# Patient Record
Sex: FEMALE | Race: WHITE | NOT HISPANIC OR LATINO | Employment: PART TIME | URBAN - METROPOLITAN AREA
[De-identification: names, ages, dates, MRNs, and addresses within clinical notes are randomized per-mention and may not be internally consistent; named-entity substitution may affect disease eponyms.]

---

## 2017-01-17 ENCOUNTER — TELEPHONE (OUTPATIENT)
Dept: INTERNAL MEDICINE | Facility: CLINIC | Age: 47
End: 2017-01-17

## 2017-01-17 NOTE — TELEPHONE ENCOUNTER
Patient advised as per below, and appt scheduled 2017.  sj    ----- Message from Cande Salcedo MA sent at 2017  5:47 PM EST -----  Regarding: FW: THYROID  Contact: [phone]      ----- Message -----     From: Trace Hughes MD     Sent: 2017   5:07 PM       To: [employer]  [facility]  Subject: FW: THYROID                                      Labs were okay, but a thyroid nodule can be present with normal labs.  She needs an appointment so I can examine her and see if she needs to have a thyroid ultrasound done.  May need more labs as well.  Thanks.  ----- Message -----     From: Karla Somers MA     Sent: 2017   4:06 PM       To: Trace Hughes MD  Subject: FW: THYROID                                          ----- Message -----     From: Ibis Otero     Sent: 2017   3:51 PM       To: [employer] [facility]  Subject: THYROID                                          :  68    ELLEN PATIENT    PATIENT CALLED VERY CONCERNED THAT HER DENTIST TOLD HER SHE HAD A LUMP ON HER THYROID. SHE WONDERS IF ANYTHING HAD SHOWN UP ON HER LABS.    PLEASE CALL AND DISCUSS WITH HER ABOUT HER THYROID LAB RESULTS AND IF SHE NEEDS TO SEE DR. HUGHES.    CELL # [phone]

## 2017-01-19 ENCOUNTER — OFFICE VISIT (OUTPATIENT)
Dept: INTERNAL MEDICINE | Facility: CLINIC | Age: 47
End: 2017-01-19

## 2017-01-19 VITALS
SYSTOLIC BLOOD PRESSURE: 80 MMHG | BODY MASS INDEX: 19.83 KG/M2 | DIASTOLIC BLOOD PRESSURE: 58 MMHG | HEART RATE: 80 BPM | WEIGHT: 105 LBS | HEIGHT: 61 IN | OXYGEN SATURATION: 98 %

## 2017-01-19 DIAGNOSIS — R53.83 OTHER FATIGUE: ICD-10-CM

## 2017-01-19 DIAGNOSIS — G47.00 INSOMNIA, UNSPECIFIED TYPE: ICD-10-CM

## 2017-01-19 DIAGNOSIS — Z86.39 HX OF THYROID NODULE: Primary | ICD-10-CM

## 2017-01-19 LAB
T4 FREE SERPL-MCNC: 1.01 NG/DL (ref 0.93–1.7)
TSH SERPL DL<=0.005 MIU/L-ACNC: 0.78 MIU/ML (ref 0.27–4.2)

## 2017-01-19 PROCEDURE — 99213 OFFICE O/P EST LOW 20 MIN: CPT | Performed by: FAMILY MEDICINE

## 2017-01-19 RX ORDER — AMITRIPTYLINE HYDROCHLORIDE 10 MG/1
40 TABLET, FILM COATED ORAL NIGHTLY
Qty: 120 TABLET | Refills: 6 | Status: SHIPPED | OUTPATIENT
Start: 2017-01-19 | End: 2017-09-03 | Stop reason: SDUPTHER

## 2017-01-19 NOTE — PROGRESS NOTES
Subjective     Purnima Altamirano is a 47 y.o. female, who presents with a chief complaint of   Chief Complaint   Patient presents with   • Thyroid Problem   • Follow-up       HPI     Pt comes in concerned about a possible thyroid nodule that was discovered by her dentist.  She is feeling fatigued and doesn't recover as well from her workouts.  She exercises approximately 7 hours weekly, including cardio and strength.    The following portions of the patient's history were reviewed and updated as appropriate: allergies, current medications, past family history, past medical history, past social history, past surgical history and problem list.    Allergies: Cefdinir    Review of Systems   Constitutional: Positive for fatigue.   HENT: Negative.    Eyes: Negative.    Respiratory: Negative.    Cardiovascular: Negative.    Gastrointestinal: Negative.    Endocrine: Negative.    Genitourinary: Positive for menstrual problem (heavy menses).   Musculoskeletal: Negative.    Skin: Negative.    Allergic/Immunologic: Negative.    Neurological: Negative.    Hematological: Negative.    Psychiatric/Behavioral: Positive for sleep disturbance.       Objective     Wt Readings from Last 3 Encounters:   01/19/17 105 lb (47.6 kg)   08/09/16 105 lb (47.6 kg)   05/23/16 104 lb 6.4 oz (47.4 kg)     Temp Readings from Last 3 Encounters:   No data found for Temp     BP Readings from Last 3 Encounters:   01/19/17 (!) 80/58   08/09/16 102/66   05/23/16 92/60     Pulse Readings from Last 3 Encounters:   01/19/17 80   08/09/16 89   05/23/16 75     Body mass index is 19.84 kg/(m^2).    Physical Exam   Constitutional: She is oriented to person, place, and time. She appears well-developed and well-nourished.   HENT:   Head: Normocephalic.   Mouth/Throat: Oropharynx is clear and moist.   Eyes: Conjunctivae and EOM are normal. Pupils are equal, round, and reactive to light.   Neck: Normal range of motion. Neck supple. No thyromegaly (I was not able to  feel a nodule on her thyroid.) present.   Cardiovascular: Normal rate, regular rhythm and normal heart sounds.    Pulmonary/Chest: Effort normal and breath sounds normal.   Abdominal: Soft. Bowel sounds are normal. There is no hepatosplenomegaly.   Musculoskeletal: Normal range of motion. She exhibits no edema.   Lymphadenopathy:     She has no cervical adenopathy.   Neurological: She is alert and oriented to person, place, and time.   Skin: Skin is warm and dry. No rash noted.   Psychiatric: She has a normal mood and affect. Her behavior is normal.   Vitals reviewed.          Results for orders placed or performed in visit on 05/28/16   Comprehensive metabolic panel   Result Value Ref Range    Glucose 85 65 - 99 mg/dL    BUN 19 6 - 20 mg/dL    Creatinine 0.70 0.57 - 1.00 mg/dL    eGFR Non African Am 90 >60 mL/min/1.73    eGFR African Am 109 >60 mL/min/1.73    BUN/Creatinine Ratio 27.1 (H) 7.0 - 25.0    Sodium 142 136 - 145 mmol/L    Potassium 4.4 3.5 - 5.2 mmol/L    Chloride 107 98 - 107 mmol/L    Total CO2 25.8 22.0 - 29.0 mmol/L    Calcium 8.0 (L) 8.6 - 10.5 mg/dL    Total Protein 5.9 (L) 6.0 - 8.5 g/dL    Albumin 3.90 3.50 - 5.20 g/dL    Globulin 2.0 gm/dL    A/G Ratio 2.0 g/dL    Total Bilirubin 0.3 0.2 - 1.2 mg/dL    Alkaline Phosphatase 44 40 - 129 U/L    AST (SGOT) 20 5 - 32 U/L    ALT (SGPT) 19 5 - 33 U/L   Lipid Panel w/ Chol/HDL Ratio   Result Value Ref Range    Total Cholesterol 151 0 - 200 mg/dL    Triglycerides 31 0 - 150 mg/dL    HDL Cholesterol 75 (H) 40 - 60 mg/dL    VLDL Cholesterol 6.2 (L) 7 - 27 mg/dL    LDL Cholesterol  70 0 - 100 mg/dL    Chol/HDL Ratio 2.01    TSH   Result Value Ref Range    TSH 1.360 0.270 - 4.200 mIU/mL   Luteinizing hormone   Result Value Ref Range    LH 4.3 mIU/mL   FSH   Result Value Ref Range    FSH 7.2 mIU/mL   Estrogens, fractionated   Result Value Ref Range    Estrone 49 pg/mL    Estradiol 39.3 pg/mL   Vitamin D 25 hydroxy   Result Value Ref Range    25 Hydroxy,  Vitamin D 58.1 ng/mL   CBC and Differential   Result Value Ref Range    WBC 3.82 (L) 4.80 - 10.80 10*3/mm3    RBC 4.14 (L) 4.20 - 5.40 10*6/mm3    Hemoglobin 13.1 12.0 - 16.0 g/dL    Hematocrit 40.3 37.0 - 47.0 %    MCV 97.3 81.0 - 99.0 fL    MCH 31.6 (H) 27.0 - 31.0 pg    MCHC 32.5 31.0 - 37.0 g/dL    RDW 12.1 11.5 - 14.5 %    Platelets 168 140 - 500 10*3/mm3    Neutrophil Rel % 76.0 (H) 45.0 - 70.0 %    Lymphocyte Rel % 14.1 (L) 20.0 - 45.0 %    Monocyte Rel % 7.3 3.0 - 8.0 %    Eosinophil Rel % 1.8 0.0 - 4.0 %    Basophil Rel % 0.8 0.0 - 2.0 %    Neutrophils Absolute 2.90 1.50 - 8.30 10*3/mm3    Lymphocytes Absolute 0.54 (L) 0.60 - 4.80 10*3/mm3    Monocytes Absolute 0.28 0.00 - 1.00 10*3/mm3    Eosinophils Absolute 0.07 (L) 0.10 - 0.30 10*3/mm3    Basophils Absolute 0.03 0.00 - 0.20 10*3/mm3    Immature Granulocyte Rel % 0.0 0.0 - 0.5 %    Immature Grans Absolute 0.00 0.00 - 0.03 10*3/mm3    nRBC 0.0 0.0 - 0.0 /100 WBC       Assessment/Plan   Purnima was seen today for thyroid problem and follow-up.    Diagnoses and all orders for this visit:    Hx of thyroid nodule  -     Thyroid Antibodies; Future  -     TSH  -     T4, Free  -     T3, Free; Future  -     US Thyroid; Future    Other fatigue  -     Ferritin    Insomnia, unspecified type  -     amitriptyline (ELAVIL) 10 MG tablet; Take 4 tablets by mouth Every Night.          Current Outpatient Prescriptions:   •  amitriptyline (ELAVIL) 10 MG tablet, Take 4 tablets by mouth Every Night., Disp: 120 tablet, Rfl: 6  •  mometasone-formoterol (DULERA) 100-5 MCG/ACT inhaler, Inhale 2 puffs 2 (two) times a day., Disp: 1 g, Rfl: 11  •  montelukast (SINGULAIR) 10 MG tablet, Take 1 tablet by mouth every night., Disp: 30 tablet, Rfl: 5  •  SUMAtriptan (IMITREX) 100 MG tablet, Take one tablet at onset of headache. May repeat dose one time in 2 hours if headache not relieved., Disp: , Rfl: 11  Medications Discontinued During This Encounter   Medication Reason   •  azithromycin (ZITHROMAX Z-RON) 250 MG tablet Therapy completed   • amitriptyline (ELAVIL) 10 MG tablet Reorder       Return in about 1 month (around 2/19/2017).

## 2017-01-19 NOTE — MR AVS SNAPSHOT
Purnima Altamirano   1/19/2017 2:30 PM   Office Visit    Dept Phone:  [phone]   Encounter #:  [id number]    Provider:  Trace Min MD   Department:  [facility] INTERNAL MED AND PEDS                Your Full Care Plan              Today's Medication Changes          These changes are accurate as of: 1/19/17  3:58 PM.  If you have any questions, ask your nurse or doctor.               Medication(s)that have changed:     amitriptyline 10 MG tablet   Commonly known as:  ELAVIL   Take 4 tablets by mouth Every Night.   What changed:    - how much to take  - how to take this  - when to take this   Changed by:  Trace Min MD         Stop taking medication(s)listed here:     azithromycin 250 MG tablet   Commonly known as:  ZITHROMAX Z-RON   Stopped by:  Trace Min MD                Where to Get Your Medications      These medications were sent to [facility]/pharmacy #[id number] - [facility] [address] [address] AT INTERSECTION OF [address] [phone] [address] [place] [id number] [place] [address]     Phone:  [phone]     amitriptyline 10 MG tablet                  Your Updated Medication List          This list is accurate as of: 1/19/17  3:58 PM.  Always use your most recent med list.                amitriptyline 10 MG tablet   Commonly known as:  ELAVIL   Take 4 tablets by mouth Every Night.       mometasone-formoterol 100-5 MCG/ACT inhaler   Commonly known as:  DULERA   Inhale 2 puffs 2 (two) times a day.       montelukast 10 MG tablet   Commonly known as:  SINGULAIR   Take 1 tablet by mouth every night.       SUMAtriptan 100 MG tablet   Commonly known as:  IMITREX               We Performed the Following     Ferritin     T3, Free     T4, Free     Thyroid Antibodies     TSH       You Were Diagnosed With        Codes Comments    Hx of thyroid nodule    -  Primary ICD-10-CM: Z86.39  ICD-9-CM: V12.29     Other fatigue     ICD-10-CM:  "R53.83  ICD-9-CM: 780.79     Insomnia, unspecified type     ICD-10-CM: G47.00  ICD-9-CM: 780.52       Instructions     None    Patient Instructions History      Upcoming Appointments     Visit Type Date Time Department    OFFICE VISIT 2017  2:30 PM [facility]      [employer] Signup     [facility] [employer] allows you to send messages to your doctor, view your test results, renew your prescriptions, schedule appointments, and more. To sign up, go to [employer] and click on the Sign Up Now link in the New User? box. Enter your [employer] Activation Code exactly as it appears below along with the last four digits of your Social Security Number and your Date of Birth () to complete the sign-up process. If you do not sign up before the expiration date, you must request a new code.    [employer] Activation Code: [email or web address]  Expires: 2017  3:58 PM    If you have questions, you can email [email or web address] or call [phone] to talk to our [employer] staff. Remember, [employer] is NOT to be used for urgent needs. For medical emergencies, dial 911.               Other Info from Your Visit           Allergies     Cefdinir        Reason for Visit     Thyroid Problem     Follow-up           Vital Signs     Blood Pressure Pulse Height Weight Oxygen Saturation Body Mass Index    80/58 80 61\" (154.9 cm) 105 lb (47.6 kg) 98% 19.84 kg/m2    Smoking Status                   Never Smoker           Problems and Diagnoses Noted     Tiredness    Hx of thyroid nodule    Difficulty falling or staying asleep        "

## 2017-01-20 LAB
FERRITIN SERPL-MCNC: 23.97 NG/ML (ref 13–150)
T3FREE SERPL-MCNC: 2.2 PG/ML (ref 2–4.4)
THYROGLOB AB SERPL-ACNC: <1 IU/ML (ref 0–0.9)
THYROPEROXIDASE AB SERPL-ACNC: 7 IU/ML (ref 0–34)

## 2017-01-25 ENCOUNTER — HOSPITAL ENCOUNTER (OUTPATIENT)
Dept: ULTRASOUND IMAGING | Facility: HOSPITAL | Age: 47
Discharge: HOME | End: 2017-01-25
Attending: FAMILY MEDICINE | Admitting: FAMILY MEDICINE

## 2017-01-25 DIAGNOSIS — Z86.39 HX OF THYROID NODULE: ICD-10-CM

## 2017-01-25 PROCEDURE — 76536 US EXAM OF HEAD AND NECK: CPT

## 2017-01-30 ENCOUNTER — TELEPHONE (OUTPATIENT)
Dept: INTERNAL MEDICINE | Facility: CLINIC | Age: 47
End: 2017-01-30

## 2017-01-30 NOTE — TELEPHONE ENCOUNTER
----- Message from Trace Min MD sent at 1/29/2017 10:33 AM EST -----  Regarding: RE: blood work results   Yes, all thyroid labs and ultrasound are normal.  Ferritin normal at 24.  Thanks.  ----- Message -----     From: Jeri Cheney MA     Sent: 1/26/2017   1:22 PM       To: Trace Min MD  Subject: blood work results                               The patient called requesting her lab results and u/s results. I did let her know that nothing came back abnormal on them. She was concerned about her Iron level.

## 2017-03-19 DIAGNOSIS — J45.20 ASTHMATIC BRONCHITIS, MILD INTERMITTENT, UNCOMPLICATED: ICD-10-CM

## 2017-03-20 RX ORDER — MONTELUKAST SODIUM 10 MG/1
TABLET ORAL
Qty: 30 TABLET | Refills: 5 | Status: SHIPPED | OUTPATIENT
Start: 2017-03-20 | End: 2018-07-23

## 2017-08-08 RX ORDER — SUMATRIPTAN 100 MG/1
TABLET, FILM COATED ORAL
Qty: 9 TABLET | Refills: 3 | Status: SHIPPED | OUTPATIENT
Start: 2017-08-08 | End: 2018-02-02 | Stop reason: SDUPTHER

## 2017-09-03 DIAGNOSIS — G47.00 INSOMNIA, UNSPECIFIED TYPE: ICD-10-CM

## 2017-09-05 RX ORDER — AMITRIPTYLINE HYDROCHLORIDE 10 MG/1
TABLET, FILM COATED ORAL
Qty: 120 TABLET | Refills: 6 | Status: SHIPPED | OUTPATIENT
Start: 2017-09-05 | End: 2018-04-01 | Stop reason: SDUPTHER

## 2017-11-27 ENCOUNTER — TRANSCRIBE ORDERS (OUTPATIENT)
Dept: ADMINISTRATIVE | Facility: HOSPITAL | Age: 47
End: 2017-11-27

## 2017-11-27 DIAGNOSIS — Z12.31 VISIT FOR SCREENING MAMMOGRAM: Primary | ICD-10-CM

## 2017-12-08 ENCOUNTER — HOSPITAL ENCOUNTER (OUTPATIENT)
Dept: MAMMOGRAPHY | Facility: HOSPITAL | Age: 47
Discharge: HOME | End: 2017-12-08
Attending: OBSTETRICS & GYNECOLOGY | Admitting: OBSTETRICS & GYNECOLOGY

## 2017-12-08 DIAGNOSIS — Z12.31 VISIT FOR SCREENING MAMMOGRAM: ICD-10-CM

## 2017-12-08 PROCEDURE — G0202 SCR MAMMO BI INCL CAD: HCPCS

## 2018-02-02 RX ORDER — SUMATRIPTAN 100 MG/1
TABLET, FILM COATED ORAL
Qty: 9 TABLET | Refills: 0 | Status: SHIPPED | OUTPATIENT
Start: 2018-02-02 | End: 2018-02-28 | Stop reason: SDUPTHER

## 2018-02-28 RX ORDER — SUMATRIPTAN 100 MG/1
TABLET, FILM COATED ORAL
Qty: 9 TABLET | Refills: 1 | Status: SHIPPED | OUTPATIENT
Start: 2018-02-28 | End: 2018-05-02 | Stop reason: SDUPTHER

## 2018-04-01 DIAGNOSIS — G47.00 INSOMNIA, UNSPECIFIED TYPE: ICD-10-CM

## 2018-04-02 RX ORDER — AMITRIPTYLINE HYDROCHLORIDE 10 MG/1
TABLET, FILM COATED ORAL
Qty: 120 TABLET | Refills: 0 | Status: SHIPPED | OUTPATIENT
Start: 2018-04-02 | End: 2018-05-02 | Stop reason: SDUPTHER

## 2018-05-02 DIAGNOSIS — G47.00 INSOMNIA, UNSPECIFIED TYPE: ICD-10-CM

## 2018-05-02 RX ORDER — SUMATRIPTAN 100 MG/1
TABLET, FILM COATED ORAL
Qty: 9 TABLET | Refills: 1 | Status: SHIPPED | OUTPATIENT
Start: 2018-05-02 | End: 2018-06-29 | Stop reason: SDUPTHER

## 2018-05-02 RX ORDER — AMITRIPTYLINE HYDROCHLORIDE 10 MG/1
TABLET, FILM COATED ORAL
Qty: 120 TABLET | Refills: 0 | Status: SHIPPED | OUTPATIENT
Start: 2018-05-02 | End: 2018-06-01 | Stop reason: SDUPTHER

## 2018-06-01 DIAGNOSIS — G47.00 INSOMNIA, UNSPECIFIED TYPE: ICD-10-CM

## 2018-06-01 RX ORDER — AMITRIPTYLINE HYDROCHLORIDE 10 MG/1
TABLET, FILM COATED ORAL
Qty: 120 TABLET | Refills: 0 | Status: SHIPPED | OUTPATIENT
Start: 2018-06-01 | End: 2018-07-06 | Stop reason: SDUPTHER

## 2018-06-29 RX ORDER — SUMATRIPTAN 100 MG/1
TABLET, FILM COATED ORAL
Qty: 9 TABLET | Refills: 0 | Status: SHIPPED | OUTPATIENT
Start: 2018-06-29 | End: 2018-07-23 | Stop reason: SDUPTHER

## 2018-06-30 DIAGNOSIS — G47.00 INSOMNIA, UNSPECIFIED TYPE: ICD-10-CM

## 2018-07-02 RX ORDER — AMITRIPTYLINE HYDROCHLORIDE 10 MG/1
TABLET, FILM COATED ORAL
Qty: 120 TABLET | Refills: 0 | OUTPATIENT
Start: 2018-07-02

## 2018-07-06 DIAGNOSIS — G47.00 INSOMNIA, UNSPECIFIED TYPE: ICD-10-CM

## 2018-07-06 RX ORDER — AMITRIPTYLINE HYDROCHLORIDE 10 MG/1
TABLET, FILM COATED ORAL
Qty: 120 TABLET | Refills: 0 | Status: SHIPPED | OUTPATIENT
Start: 2018-07-06 | End: 2018-07-23 | Stop reason: SDUPTHER

## 2018-07-09 DIAGNOSIS — Z00.00 ROUTINE HEALTH MAINTENANCE: Primary | ICD-10-CM

## 2018-07-19 ENCOUNTER — LAB (OUTPATIENT)
Dept: INTERNAL MEDICINE | Facility: CLINIC | Age: 48
End: 2018-07-19

## 2018-07-19 DIAGNOSIS — Z00.00 ROUTINE HEALTH MAINTENANCE: ICD-10-CM

## 2018-07-20 LAB
25(OH)D3+25(OH)D2 SERPL-MCNC: 50.6 NG/ML
ALBUMIN SERPL-MCNC: 4.4 G/DL (ref 3.5–5.2)
ALBUMIN/GLOB SERPL: 2.6 G/DL
ALP SERPL-CCNC: 36 U/L (ref 40–129)
ALT SERPL-CCNC: 20 U/L (ref 5–33)
AST SERPL-CCNC: 24 U/L (ref 5–32)
BASOPHILS # BLD AUTO: 0.01 10*3/MM3 (ref 0–0.2)
BASOPHILS NFR BLD AUTO: 0.3 % (ref 0–2)
BILIRUB SERPL-MCNC: 0.3 MG/DL (ref 0.2–1.2)
BUN SERPL-MCNC: 17 MG/DL (ref 6–20)
BUN/CREAT SERPL: 24.3 (ref 7–25)
CALCIUM SERPL-MCNC: 9.1 MG/DL (ref 8.6–10.5)
CHLORIDE SERPL-SCNC: 103 MMOL/L (ref 98–107)
CHOLEST SERPL-MCNC: 188 MG/DL (ref 0–200)
CHOLEST/HDLC SERPL: 2.04 {RATIO}
CO2 SERPL-SCNC: 28.3 MMOL/L (ref 22–29)
CREAT SERPL-MCNC: 0.7 MG/DL (ref 0.57–1)
EOSINOPHIL # BLD AUTO: 0.04 10*3/MM3 (ref 0.1–0.3)
EOSINOPHIL NFR BLD AUTO: 1.2 % (ref 0–4)
ERYTHROCYTE [DISTWIDTH] IN BLOOD BY AUTOMATED COUNT: 11.8 % (ref 11.5–14.5)
FSH SERPL-ACNC: 5 MIU/ML
GLOBULIN SER CALC-MCNC: 1.7 GM/DL
GLUCOSE SERPL-MCNC: 90 MG/DL (ref 65–99)
HCT VFR BLD AUTO: 43.1 % (ref 37–47)
HDLC SERPL-MCNC: 92 MG/DL (ref 40–60)
HGB BLD-MCNC: 14.5 G/DL (ref 12–16)
IMM GRANULOCYTES # BLD: 0 10*3/MM3 (ref 0–0.03)
IMM GRANULOCYTES NFR BLD: 0 % (ref 0–0.5)
LDLC SERPL CALC-MCNC: 89 MG/DL (ref 0–100)
LH SERPL-ACNC: 3.8 MIU/ML
LYMPHOCYTES # BLD AUTO: 0.75 10*3/MM3 (ref 0.6–4.8)
LYMPHOCYTES NFR BLD AUTO: 22.5 % (ref 20–45)
MCH RBC QN AUTO: 33.2 PG (ref 27–31)
MCHC RBC AUTO-ENTMCNC: 33.6 G/DL (ref 31–37)
MCV RBC AUTO: 98.6 FL (ref 81–99)
MONOCYTES # BLD AUTO: 0.29 10*3/MM3 (ref 0–1)
MONOCYTES NFR BLD AUTO: 8.7 % (ref 3–8)
NEUTROPHILS # BLD AUTO: 2.25 10*3/MM3 (ref 1.5–8.3)
NEUTROPHILS NFR BLD AUTO: 67.3 % (ref 45–70)
NRBC BLD AUTO-RTO: 0 /100 WBC (ref 0–0)
PLATELET # BLD AUTO: 187 10*3/MM3 (ref 140–500)
POTASSIUM SERPL-SCNC: 4 MMOL/L (ref 3.5–5.2)
PROT SERPL-MCNC: 6.1 G/DL (ref 6–8.5)
RBC # BLD AUTO: 4.37 10*6/MM3 (ref 4.2–5.4)
SODIUM SERPL-SCNC: 140 MMOL/L (ref 136–145)
T4 FREE SERPL-MCNC: 1.04 NG/DL (ref 0.93–1.7)
THYROGLOB AB SERPL-ACNC: <1 IU/ML (ref 0–0.9)
THYROPEROXIDASE AB SERPL-ACNC: 10 IU/ML (ref 0–34)
TRIGL SERPL-MCNC: 36 MG/DL (ref 0–150)
TSH SERPL DL<=0.005 MIU/L-ACNC: 1.46 MIU/ML (ref 0.27–4.2)
VLDLC SERPL CALC-MCNC: 7.2 MG/DL (ref 7–27)
WBC # BLD AUTO: 3.34 10*3/MM3 (ref 4.8–10.8)

## 2018-07-23 ENCOUNTER — OFFICE VISIT (OUTPATIENT)
Dept: INTERNAL MEDICINE | Facility: CLINIC | Age: 48
End: 2018-07-23

## 2018-07-23 VITALS
BODY MASS INDEX: 19.94 KG/M2 | SYSTOLIC BLOOD PRESSURE: 106 MMHG | WEIGHT: 105.6 LBS | TEMPERATURE: 98.1 F | DIASTOLIC BLOOD PRESSURE: 62 MMHG | HEIGHT: 61 IN | RESPIRATION RATE: 16 BRPM | OXYGEN SATURATION: 98 % | HEART RATE: 81 BPM

## 2018-07-23 DIAGNOSIS — G47.00 INSOMNIA, UNSPECIFIED TYPE: ICD-10-CM

## 2018-07-23 DIAGNOSIS — J45.20 ASTHMATIC BRONCHITIS, MILD INTERMITTENT, UNCOMPLICATED: ICD-10-CM

## 2018-07-23 DIAGNOSIS — J45.20 MILD INTERMITTENT ASTHMA WITHOUT COMPLICATION: ICD-10-CM

## 2018-07-23 DIAGNOSIS — Z85.828 HISTORY OF BASAL CELL CARCINOMA OF SKIN: ICD-10-CM

## 2018-07-23 DIAGNOSIS — N92.0 MENORRHAGIA WITH REGULAR CYCLE: ICD-10-CM

## 2018-07-23 DIAGNOSIS — G43.909 MIGRAINE WITHOUT STATUS MIGRAINOSUS, NOT INTRACTABLE, UNSPECIFIED MIGRAINE TYPE: Primary | ICD-10-CM

## 2018-07-23 PROBLEM — Z86.39 HX OF THYROID NODULE: Status: RESOLVED | Noted: 2017-01-19 | Resolved: 2018-07-23

## 2018-07-23 PROCEDURE — 99214 OFFICE O/P EST MOD 30 MIN: CPT | Performed by: FAMILY MEDICINE

## 2018-07-23 RX ORDER — AMITRIPTYLINE HYDROCHLORIDE 10 MG/1
TABLET, FILM COATED ORAL
Qty: 120 TABLET | Refills: 11 | Status: SHIPPED | OUTPATIENT
Start: 2018-07-23 | End: 2019-07-28 | Stop reason: SDUPTHER

## 2018-07-23 RX ORDER — SUMATRIPTAN 100 MG/1
100 TABLET, FILM COATED ORAL ONCE AS NEEDED
Qty: 9 TABLET | Refills: 11 | Status: SHIPPED | OUTPATIENT
Start: 2018-07-23 | End: 2019-07-27 | Stop reason: SDUPTHER

## 2018-07-23 NOTE — PROGRESS NOTES
Subjective     Purnima Altamirano is a 48 y.o. female, who presents with a chief complaint of   Chief Complaint   Patient presents with   • Migraine     Follow up on labs       HPI     1. Migraines.    2. Heavy menses.    3. Insomnia.      The following portions of the patient's history were reviewed and updated as appropriate: allergies, current medications, past family history, past medical history, past social history, past surgical history and problem list.    Allergies: Cefdinir    Review of Systems   Constitutional: Positive for fatigue.   Eyes: Negative.    Respiratory: Negative.    Cardiovascular: Negative.    Gastrointestinal: Negative.    Endocrine: Negative.    Genitourinary: Positive for menstrual problem (heavy menses).   Musculoskeletal: Negative.    Skin: Negative.    Allergic/Immunologic: Negative.    Neurological: Positive for headaches.   Hematological: Negative.    Psychiatric/Behavioral: Positive for sleep disturbance.       Objective     Wt Readings from Last 3 Encounters:   07/23/18 47.9 kg (105 lb 9.6 oz)   01/19/17 47.6 kg (105 lb)   08/09/16 47.6 kg (105 lb)     Temp Readings from Last 3 Encounters:   07/23/18 98.1 °F (36.7 °C)     BP Readings from Last 3 Encounters:   07/23/18 106/62   01/19/17 (!) 80/58   08/09/16 102/66     Pulse Readings from Last 3 Encounters:   07/23/18 81   01/19/17 80   08/09/16 89     Body mass index is 19.95 kg/m².    Physical Exam   Constitutional: She is oriented to person, place, and time. She appears well-developed and well-nourished.   HENT:   Head: Normocephalic and atraumatic.   Mouth/Throat: Oropharynx is clear and moist.   Eyes: Conjunctivae and EOM are normal.   Neck: Neck supple. No thyromegaly present.   Cardiovascular: Normal rate, regular rhythm and normal heart sounds.    Pulmonary/Chest: Effort normal and breath sounds normal.   Abdominal: Soft. Bowel sounds are normal. There is no hepatosplenomegaly.   Musculoskeletal: Normal range of motion. She  exhibits no edema.   Lymphadenopathy:     She has no cervical adenopathy.   Neurological: She is alert and oriented to person, place, and time.   Skin: Skin is warm and dry. No rash noted.   Psychiatric: She has a normal mood and affect. Her behavior is normal.   Nursing note and vitals reviewed.          Results for orders placed or performed in visit on 07/19/18   Comprehensive Metabolic Panel   Result Value Ref Range    Glucose 90 65 - 99 mg/dL    BUN 17 6 - 20 mg/dL    Creatinine 0.70 0.57 - 1.00 mg/dL    eGFR Non African Am 89 >60 mL/min/1.73    eGFR African Am 108 >60 mL/min/1.73    BUN/Creatinine Ratio 24.3 7.0 - 25.0    Sodium 140 136 - 145 mmol/L    Potassium 4.0 3.5 - 5.2 mmol/L    Chloride 103 98 - 107 mmol/L    Total CO2 28.3 22.0 - 29.0 mmol/L    Calcium 9.1 8.6 - 10.5 mg/dL    Total Protein 6.1 6.0 - 8.5 g/dL    Albumin 4.40 3.50 - 5.20 g/dL    Globulin 1.7 gm/dL    A/G Ratio 2.6 g/dL    Total Bilirubin 0.3 0.2 - 1.2 mg/dL    Alkaline Phosphatase 36 (L) 40 - 129 U/L    AST (SGOT) 24 5 - 32 U/L    ALT (SGPT) 20 5 - 33 U/L   Lipid Panel With / Chol / HDL Ratio   Result Value Ref Range    Total Cholesterol 188 0 - 200 mg/dL    Triglycerides 36 0 - 150 mg/dL    HDL Cholesterol 92 (H) 40 - 60 mg/dL    VLDL Cholesterol 7.2 7 - 27 mg/dL    LDL Cholesterol  89 0 - 100 mg/dL    Chol/HDL Ratio 2.04    TSH   Result Value Ref Range    TSH 1.460 0.270 - 4.200 mIU/mL   T4, Free   Result Value Ref Range    Free T4 1.04 0.93 - 1.70 ng/dL   Vitamin D 25 Hydroxy   Result Value Ref Range    25 Hydroxy, Vitamin D 50.6 ng/ml   Thyroid Antibodies   Result Value Ref Range    Thyroid Peroxidase Antibody 10 0 - 34 IU/mL    Thyroglobulin Ab <1.0 0.0 - 0.9 IU/mL   FSH & LH   Result Value Ref Range    LH 3.8 mIU/mL    FSH 5.0 mIU/mL   CBC & Differential   Result Value Ref Range    WBC 3.34 (L) 4.80 - 10.80 10*3/mm3    RBC 4.37 4.20 - 5.40 10*6/mm3    Hemoglobin 14.5 12.0 - 16.0 g/dL    Hematocrit 43.1 37.0 - 47.0 %    MCV 98.6  81.0 - 99.0 fL    MCH 33.2 (H) 27.0 - 31.0 pg    MCHC 33.6 31.0 - 37.0 g/dL    RDW 11.8 11.5 - 14.5 %    Platelets 187 140 - 500 10*3/mm3    Neutrophil Rel % 67.3 45.0 - 70.0 %    Lymphocyte Rel % 22.5 20.0 - 45.0 %    Monocyte Rel % 8.7 (H) 3.0 - 8.0 %    Eosinophil Rel % 1.2 0.0 - 4.0 %    Basophil Rel % 0.3 0.0 - 2.0 %    Neutrophils Absolute 2.25 1.50 - 8.30 10*3/mm3    Lymphocytes Absolute 0.75 0.60 - 4.80 10*3/mm3    Monocytes Absolute 0.29 0.00 - 1.00 10*3/mm3    Eosinophils Absolute 0.04 (L) 0.10 - 0.30 10*3/mm3    Basophils Absolute 0.01 0.00 - 0.20 10*3/mm3    Immature Granulocyte Rel % 0.0 0.0 - 0.5 %    Immature Grans Absolute 0.00 0.00 - 0.03 10*3/mm3    nRBC 0.0 0.0 - 0.0 /100 WBC       Assessment/Plan   Purnima was seen today for migraine.    Diagnoses and all orders for this visit:    Migraine without status migrainosus, not intractable, unspecified migraine type    History of basal cell carcinoma of skin    Insomnia, unspecified type    Mild intermittent asthma without complication    Menorrhagia with regular cycle      1. Migraines.  Controlled with prn sumatriptan.    2. History of basal cell carcinoma.  Followed by Dr. Green.    3. Insomnia.  Controlled with amitriptyline.    4. Asthma.  No flare.  Uses prn Dulera.    5. Menorrhagia.  Labs reviewed.  She is followed by Dr. Benito.    Current Outpatient Prescriptions:   •  amitriptyline (ELAVIL) 10 MG tablet, Take 4 tablets po q hs, Disp: 120 tablet, Rfl: 0  •  mometasone-formoterol (DULERA) 100-5 MCG/ACT inhaler, Inhale 2 puffs 2 (two) times a day., Disp: 1 g, Rfl: 11  •  SUMAtriptan (IMITREX) 100 MG tablet, TAKE 1 TABLET BY MOUTH TWICE A DAY AS NEEDED FOR HEADACHE, Disp: 9 tablet, Rfl: 0  Medications Discontinued During This Encounter   Medication Reason   • montelukast (SINGULAIR) 10 MG tablet *Therapy completed       Return in about 1 year (around 7/23/2019).

## 2018-08-02 DIAGNOSIS — J45.20 ASTHMATIC BRONCHITIS, MILD INTERMITTENT, UNCOMPLICATED: ICD-10-CM

## 2018-11-26 ENCOUNTER — TRANSCRIBE ORDERS (OUTPATIENT)
Dept: ADMINISTRATIVE | Facility: HOSPITAL | Age: 48
End: 2018-11-26

## 2018-11-26 DIAGNOSIS — Z12.31 SCREENING MAMMOGRAM, ENCOUNTER FOR: Primary | ICD-10-CM

## 2018-12-11 ENCOUNTER — HOSPITAL ENCOUNTER (OUTPATIENT)
Dept: MAMMOGRAPHY | Facility: HOSPITAL | Age: 48
Discharge: HOME | End: 2018-12-11
Attending: OBSTETRICS & GYNECOLOGY | Admitting: OBSTETRICS & GYNECOLOGY

## 2018-12-11 DIAGNOSIS — Z12.31 SCREENING MAMMOGRAM, ENCOUNTER FOR: ICD-10-CM

## 2018-12-11 PROCEDURE — 77067 SCR MAMMO BI INCL CAD: CPT

## 2018-12-14 RX ORDER — MELOXICAM 7.5 MG/1
TABLET ORAL
Qty: 90 TABLET | Refills: 1 | Status: SHIPPED | OUTPATIENT
Start: 2018-12-14 | End: 2019-01-25

## 2019-01-25 ENCOUNTER — OFFICE VISIT (OUTPATIENT)
Dept: INTERNAL MEDICINE | Facility: CLINIC | Age: 49
End: 2019-01-25

## 2019-01-25 VITALS
SYSTOLIC BLOOD PRESSURE: 108 MMHG | TEMPERATURE: 98 F | WEIGHT: 105 LBS | RESPIRATION RATE: 14 BRPM | BODY MASS INDEX: 19.83 KG/M2 | HEART RATE: 83 BPM | DIASTOLIC BLOOD PRESSURE: 68 MMHG | HEIGHT: 61 IN | OXYGEN SATURATION: 99 %

## 2019-01-25 DIAGNOSIS — R09.1 PLEURISY: ICD-10-CM

## 2019-01-25 DIAGNOSIS — R07.89 ATYPICAL CHEST PAIN: ICD-10-CM

## 2019-01-25 DIAGNOSIS — R06.2 WHEEZING: Primary | ICD-10-CM

## 2019-01-25 PROCEDURE — 93000 ELECTROCARDIOGRAM COMPLETE: CPT | Performed by: INTERNAL MEDICINE

## 2019-01-25 PROCEDURE — 99214 OFFICE O/P EST MOD 30 MIN: CPT | Performed by: INTERNAL MEDICINE

## 2019-01-25 PROCEDURE — 94640 AIRWAY INHALATION TREATMENT: CPT | Performed by: INTERNAL MEDICINE

## 2019-01-25 RX ORDER — IBUPROFEN 600 MG/1
600 TABLET ORAL EVERY 8 HOURS PRN
Qty: 30 TABLET | Refills: 0 | Status: SHIPPED | OUTPATIENT
Start: 2019-01-25

## 2019-01-25 RX ORDER — PREDNISONE 20 MG/1
40 TABLET ORAL DAILY
Qty: 10 TABLET | Refills: 0 | Status: SHIPPED | OUTPATIENT
Start: 2019-01-25 | End: 2019-01-30

## 2019-01-25 RX ORDER — ALBUTEROL SULFATE 2.5 MG/3ML
2.5 SOLUTION RESPIRATORY (INHALATION) ONCE
Status: COMPLETED | OUTPATIENT
Start: 2019-01-25 | End: 2019-01-25

## 2019-01-25 RX ADMIN — ALBUTEROL SULFATE 2.5 MG: 2.5 SOLUTION RESPIRATORY (INHALATION) at 15:56

## 2019-01-25 NOTE — PROGRESS NOTES
Purnima Altamirano is a 49 y.o. female, who presents with a chief complaint of   Chief Complaint   Patient presents with   • Nasal Congestion     1 x week        HPI   Pt c/o congestion x 1 week.  This has made her chest feel heavy like there is lots of mucus stuck there.  her chest hurts a little with a deep breath.  No soa.  She restarted her dulera and has been taking mucinex this week.  No real help with her inhaler.  No wheeze.  Very little cough.  No fever.  No sore throat.  No ear pain.     The following portions of the patient's history were reviewed and updated as appropriate: allergies, current medications, past family history, past medical history, past social history, past surgical history and problem list.    Allergies: Cefdinir    Review of Systems   Constitutional: Negative.    HENT: Negative.    Eyes: Negative.    Respiratory: Positive for cough and chest tightness. Negative for shortness of breath and wheezing.    Cardiovascular: Negative.    Gastrointestinal: Negative.    Endocrine: Negative.    Genitourinary: Negative.    Musculoskeletal: Negative.    Skin: Negative.    Allergic/Immunologic: Negative.    Neurological: Negative.    Hematological: Negative.    Psychiatric/Behavioral: Negative.    All other systems reviewed and are negative.            Wt Readings from Last 3 Encounters:   01/25/19 47.6 kg (105 lb)   07/23/18 47.9 kg (105 lb 9.6 oz)   01/19/17 47.6 kg (105 lb)     Temp Readings from Last 3 Encounters:   01/25/19 98 °F (36.7 °C) (Oral)   07/23/18 98.1 °F (36.7 °C)     BP Readings from Last 3 Encounters:   01/25/19 108/68   07/23/18 106/62   01/19/17 (!) 80/58     Pulse Readings from Last 3 Encounters:   01/25/19 83   07/23/18 81   01/19/17 80     Body mass index is 19.84 kg/m².  @LASTSAO2(3)@    Physical Exam   Constitutional: She is oriented to person, place, and time. She appears well-developed and well-nourished. No distress.   HENT:   Head: Normocephalic and atraumatic.    Right Ear: External ear normal.   Left Ear: External ear normal.   Nose: Nose normal.   Mouth/Throat: Oropharynx is clear and moist.   Eyes: Conjunctivae and EOM are normal. Pupils are equal, round, and reactive to light.   Neck: Normal range of motion. Neck supple.   Cardiovascular: Normal rate, regular rhythm, normal heart sounds and intact distal pulses.   Pulmonary/Chest: Effort normal and breath sounds normal. No respiratory distress. She has no wheezes.   Musculoskeletal: Normal range of motion.   Normal gait   Neurological: She is alert and oriented to person, place, and time.   Skin: Skin is warm and dry.   Psychiatric: She has a normal mood and affect. Her behavior is normal. Judgment and thought content normal.   Nursing note and vitals reviewed.      Results for orders placed or performed in visit on 07/19/18   Comprehensive Metabolic Panel   Result Value Ref Range    Glucose 90 65 - 99 mg/dL    BUN 17 6 - 20 mg/dL    Creatinine 0.70 0.57 - 1.00 mg/dL    eGFR Non African Am 89 >60 mL/min/1.73    eGFR African Am 108 >60 mL/min/1.73    BUN/Creatinine Ratio 24.3 7.0 - 25.0    Sodium 140 136 - 145 mmol/L    Potassium 4.0 3.5 - 5.2 mmol/L    Chloride 103 98 - 107 mmol/L    Total CO2 28.3 22.0 - 29.0 mmol/L    Calcium 9.1 8.6 - 10.5 mg/dL    Total Protein 6.1 6.0 - 8.5 g/dL    Albumin 4.40 3.50 - 5.20 g/dL    Globulin 1.7 gm/dL    A/G Ratio 2.6 g/dL    Total Bilirubin 0.3 0.2 - 1.2 mg/dL    Alkaline Phosphatase 36 (L) 40 - 129 U/L    AST (SGOT) 24 5 - 32 U/L    ALT (SGPT) 20 5 - 33 U/L   Lipid Panel With / Chol / HDL Ratio   Result Value Ref Range    Total Cholesterol 188 0 - 200 mg/dL    Triglycerides 36 0 - 150 mg/dL    HDL Cholesterol 92 (H) 40 - 60 mg/dL    VLDL Cholesterol 7.2 7 - 27 mg/dL    LDL Cholesterol  89 0 - 100 mg/dL    Chol/HDL Ratio 2.04    TSH   Result Value Ref Range    TSH 1.460 0.270 - 4.200 mIU/mL   T4, Free   Result Value Ref Range    Free T4 1.04 0.93 - 1.70 ng/dL   Vitamin D 25 Hydroxy    Result Value Ref Range    25 Hydroxy, Vitamin D 50.6 ng/ml   Thyroid Antibodies   Result Value Ref Range    Thyroid Peroxidase Antibody 10 0 - 34 IU/mL    Thyroglobulin Ab <1.0 0.0 - 0.9 IU/mL   FSH & LH   Result Value Ref Range    LH 3.8 mIU/mL    FSH 5.0 mIU/mL   CBC & Differential   Result Value Ref Range    WBC 3.34 (L) 4.80 - 10.80 10*3/mm3    RBC 4.37 4.20 - 5.40 10*6/mm3    Hemoglobin 14.5 12.0 - 16.0 g/dL    Hematocrit 43.1 37.0 - 47.0 %    MCV 98.6 81.0 - 99.0 fL    MCH 33.2 (H) 27.0 - 31.0 pg    MCHC 33.6 31.0 - 37.0 g/dL    RDW 11.8 11.5 - 14.5 %    Platelets 187 140 - 500 10*3/mm3    Neutrophil Rel % 67.3 45.0 - 70.0 %    Lymphocyte Rel % 22.5 20.0 - 45.0 %    Monocyte Rel % 8.7 (H) 3.0 - 8.0 %    Eosinophil Rel % 1.2 0.0 - 4.0 %    Basophil Rel % 0.3 0.0 - 2.0 %    Neutrophils Absolute 2.25 1.50 - 8.30 10*3/mm3    Lymphocytes Absolute 0.75 0.60 - 4.80 10*3/mm3    Monocytes Absolute 0.29 0.00 - 1.00 10*3/mm3    Eosinophils Absolute 0.04 (L) 0.10 - 0.30 10*3/mm3    Basophils Absolute 0.01 0.00 - 0.20 10*3/mm3    Immature Granulocyte Rel % 0.0 0.0 - 0.5 %    Immature Grans Absolute 0.00 0.00 - 0.03 10*3/mm3    nRBC 0.0 0.0 - 0.0 /100 WBC       ECG 12 Lead  Date/Time: 1/25/2019 5:11 PM  Performed by: Jeanna Ferrer MD  Authorized by: Jeanna Ferrer MD   Comparison: not compared with previous ECG   Previous ECG: no previous ECG available  Rhythm: sinus rhythm  Rate: normal  Conduction: conduction normal  ST Segments: ST segments normal  T Waves: T waves normal  QRS axis: normal  Clinical impression: normal ECG                Purnima was seen today for nasal congestion.    Diagnoses and all orders for this visit:    Wheezing  -     albuterol (PROVENTIL) nebulizer solution 0.083% 2.5 mg/3mL; Take 2.5 mg by nebulization 1 (One) Time.    Atypical chest pain  -     ECG 12 Lead    Pleurisy    Other orders  -     ibuprofen (ADVIL,MOTRIN) 600 MG tablet; Take 1 tablet by mouth Every 8 (Eight)  Hours As Needed for Mild Pain .  -     predniSONE (DELTASONE) 20 MG tablet; Take 2 tablets by mouth Daily for 5 days.        ekg normal.  No change in exam or sx with albuterol.  Treat for pleurisy.  To ER if worsening or RTC if not improving in 2-3 days    Outpatient Medications Prior to Visit   Medication Sig Dispense Refill   • amitriptyline (ELAVIL) 10 MG tablet Take 4 tablets po q hs 120 tablet 11   • mometasone-formoterol (DULERA) 100-5 MCG/ACT inhaler Inhale 2 puffs 2 (Two) Times a Day. 13 g 11   • SUMAtriptan (IMITREX) 100 MG tablet Take 1 tablet by mouth 1 (One) Time As Needed for Migraine for up to 1 dose. 9 tablet 11   • meloxicam (MOBIC) 7.5 MG tablet TAKE 1 TABLET BY MOUTH EVERY DAY WITH FOOD 90 tablet 1     No facility-administered medications prior to visit.      New Medications Ordered This Visit   Medications   • albuterol (PROVENTIL) nebulizer solution 0.083% 2.5 mg/3mL   • ibuprofen (ADVIL,MOTRIN) 600 MG tablet     Sig: Take 1 tablet by mouth Every 8 (Eight) Hours As Needed for Mild Pain .     Dispense:  30 tablet     Refill:  0   • predniSONE (DELTASONE) 20 MG tablet     Sig: Take 2 tablets by mouth Daily for 5 days.     Dispense:  10 tablet     Refill:  0     [unfilled]  Medications Discontinued During This Encounter   Medication Reason   • meloxicam (MOBIC) 7.5 MG tablet          Return if symptoms worsen or fail to improve.

## 2019-03-05 RX ORDER — OSELTAMIVIR PHOSPHATE 75 MG/1
75 CAPSULE ORAL DAILY
Qty: 10 CAPSULE | Refills: 0 | Status: SHIPPED | OUTPATIENT
Start: 2019-03-05 | End: 2019-10-14

## 2019-07-28 DIAGNOSIS — G47.00 INSOMNIA, UNSPECIFIED TYPE: ICD-10-CM

## 2019-07-29 RX ORDER — SUMATRIPTAN 100 MG/1
100 TABLET, FILM COATED ORAL ONCE AS NEEDED
Qty: 9 TABLET | Refills: 11 | Status: SHIPPED | OUTPATIENT
Start: 2019-07-29 | End: 2020-07-22

## 2019-07-29 RX ORDER — AMITRIPTYLINE HYDROCHLORIDE 10 MG/1
TABLET, FILM COATED ORAL
Qty: 120 TABLET | Refills: 11 | Status: SHIPPED | OUTPATIENT
Start: 2019-07-29 | End: 2020-06-29

## 2019-09-18 ENCOUNTER — TELEPHONE (OUTPATIENT)
Dept: OBSTETRICS AND GYNECOLOGY | Facility: CLINIC | Age: 49
End: 2019-09-18

## 2019-09-18 NOTE — TELEPHONE ENCOUNTER
PT CALLED AND NEEDS TO BE SEEN FOR ANNUAL. LAST SEEN ON 3/101/9. SHE STATES SHE HAS ENLARGED UTERUS AND HER PERIODS HAVE BEEN IRREGULAR LAST FEW MONTHS AND IS BLEEDING FOR WEEKS AT A TIME. SHE WANTS TO BE SEEN BEFORE THE END OF THE CALENDER YEAR. IS IT OK TO WORK HER IN WITH YOU? SHE CAN COME TO EITHER OFFICE. THANKS

## 2019-10-14 ENCOUNTER — RESULTS ENCOUNTER (OUTPATIENT)
Dept: OBSTETRICS AND GYNECOLOGY | Facility: CLINIC | Age: 49
End: 2019-10-14

## 2019-10-14 ENCOUNTER — OFFICE VISIT (OUTPATIENT)
Dept: OBSTETRICS AND GYNECOLOGY | Facility: CLINIC | Age: 49
End: 2019-10-14

## 2019-10-14 ENCOUNTER — PROCEDURE VISIT (OUTPATIENT)
Dept: OBSTETRICS AND GYNECOLOGY | Facility: CLINIC | Age: 49
End: 2019-10-14

## 2019-10-14 VITALS
WEIGHT: 102.4 LBS | DIASTOLIC BLOOD PRESSURE: 62 MMHG | HEIGHT: 61 IN | BODY MASS INDEX: 19.33 KG/M2 | SYSTOLIC BLOOD PRESSURE: 100 MMHG

## 2019-10-14 DIAGNOSIS — Z01.419 ROUTINE GYNECOLOGICAL EXAMINATION: ICD-10-CM

## 2019-10-14 DIAGNOSIS — Z01.419 PAP SMEAR, LOW-RISK: Primary | ICD-10-CM

## 2019-10-14 DIAGNOSIS — Z13.9 SCREENING FOR CONDITION: ICD-10-CM

## 2019-10-14 DIAGNOSIS — N80.03 ADENOMYOSIS: ICD-10-CM

## 2019-10-14 DIAGNOSIS — N92.6 IRREGULAR MENSTRUAL BLEEDING: Primary | ICD-10-CM

## 2019-10-14 DIAGNOSIS — N93.8 DUB (DYSFUNCTIONAL UTERINE BLEEDING): ICD-10-CM

## 2019-10-14 DIAGNOSIS — Z11.51 SPECIAL SCREENING EXAMINATION FOR HUMAN PAPILLOMAVIRUS (HPV): ICD-10-CM

## 2019-10-14 LAB
B-HCG UR QL: NEGATIVE
BILIRUB BLD-MCNC: NEGATIVE MG/DL
CLARITY, POC: CLEAR
COLOR UR: YELLOW
GLUCOSE UR STRIP-MCNC: NEGATIVE MG/DL
INTERNAL NEGATIVE CONTROL: NEGATIVE
INTERNAL POSITIVE CONTROL: POSITIVE
KETONES UR QL: NEGATIVE
LEUKOCYTE EST, POC: NEGATIVE
Lab: NORMAL
NITRITE UR-MCNC: NEGATIVE MG/ML
PH UR: 5 [PH] (ref 5–8)
PROT UR STRIP-MCNC: NEGATIVE MG/DL
RBC # UR STRIP: NEGATIVE /UL
SP GR UR: 1 (ref 1–1.03)
UROBILINOGEN UR QL: NORMAL

## 2019-10-14 PROCEDURE — 81002 URINALYSIS NONAUTO W/O SCOPE: CPT | Performed by: OBSTETRICS & GYNECOLOGY

## 2019-10-14 PROCEDURE — 76830 TRANSVAGINAL US NON-OB: CPT | Performed by: OBSTETRICS & GYNECOLOGY

## 2019-10-14 PROCEDURE — 99213 OFFICE O/P EST LOW 20 MIN: CPT | Performed by: OBSTETRICS & GYNECOLOGY

## 2019-10-14 PROCEDURE — 81025 URINE PREGNANCY TEST: CPT | Performed by: OBSTETRICS & GYNECOLOGY

## 2019-10-14 PROCEDURE — 99396 PREV VISIT EST AGE 40-64: CPT | Performed by: OBSTETRICS & GYNECOLOGY

## 2019-10-14 NOTE — PROGRESS NOTES
"GYN Annual Exam     CC- Here for annual exam.     Purnima Altamirano is a 49 y.o. female established patient who presents for annual well woman exam. Not seen since 3/2017. Having fairly regular cycles but had 2 weeks of VB a few months ago. US today shows 9.84 cm AV, EL 0.7 cm and normal ovaries. Occ HF. Not bad.  She does not want treatment for her periods are for hot flashes at this time.  She was previously on low Loestrin and had bad side effects.  We did discuss that if she has another episode where her cycles are extremely heavy, she should consider calling back for Lysteda.  She does have a very low weight.  She is a heavy exerciser but states she eats 2000 to 2200 trini a day.  She states her PMDD is \"fine\"      OB History      Para Term  AB Living    2 2 2     2    SAB TAB Ectopic Molar Multiple Live Births                       Obstetric Comments    2           Menarche:12  Menopause:not yet  HRT:none  Current contraception: condoms  History of abnormal Pap smear: yes -  s/p cryo and normal followup   History of abnormal mammogram: no  Family history of uterine, colon or ovarian cancer: no  Family history of breast cancer: no  STD's: none  Last pap smear:3/2017- nl pap/HPV  Gardasil: none  HALEY: none   Adenomyosis  PMDD      Health Maintenance   Topic Date Due   • Annual Gynecologic Pelvic and Breast Exam  1970   • ANNUAL PHYSICAL  1972   • TDAP/TD VACCINES (1 - Tdap) 1988   • PAP SMEAR  2016   • COLONOSCOPY  2016   • ZOSTER VACCINE (1 of 2) 2019   • INFLUENZA VACCINE  2019   • MAMMOGRAM  2020       Past Medical History:   Diagnosis Date   • Abnormal Pap smear of cervix    • Adenomyosis    • Cervical dysplasia     Status post laser with normal follow-ups   • IBS (irritable bowel syndrome)    • PMS (premenstrual syndrome)        Past Surgical History:   Procedure Laterality Date   • AUGMENTATION MAMMAPLASTY Bilateral    • CERVIX LESION " "DESTRUCTION     • COLONOSCOPY      4 or 5 years ago Dr. Russell    • KNEE SURGERY Left    • MAMMO BILATERAL  01/2016         Current Outpatient Medications:   •  amitriptyline (ELAVIL) 10 MG tablet, TAKE 4 TABLETS BY MOUTH AT BEDTIME, Disp: 120 tablet, Rfl: 11  •  ibuprofen (ADVIL,MOTRIN) 600 MG tablet, Take 1 tablet by mouth Every 8 (Eight) Hours As Needed for Mild Pain ., Disp: 30 tablet, Rfl: 0  •  SUMAtriptan (IMITREX) 100 MG tablet, TAKE 1 TABLET BY MOUTH 1 (ONE) TIME AS NEEDED FOR MIGRAINE FOR UP TO 1 DOSE., Disp: 9 tablet, Rfl: 11    Allergies   Allergen Reactions   • Cefdinir        Social History     Tobacco Use   • Smoking status: Never Smoker   Substance Use Topics   • Alcohol use: No   • Drug use: No       Family History   Problem Relation Age of Onset   • Stroke Maternal Grandmother    • Breast cancer Neg Hx    • Ovarian cancer Neg Hx    • Colon cancer Neg Hx    • Deep vein thrombosis Neg Hx    • Pulmonary embolism Neg Hx        Review of Systems   Constitutional: Positive for unexpected weight change (loss). Negative for appetite change, fatigue and fever.   Eyes: Negative for photophobia and visual disturbance.   Respiratory: Negative for cough and shortness of breath.    Cardiovascular: Negative for chest pain and palpitations.   Gastrointestinal: Negative for abdominal distention, abdominal pain, constipation, diarrhea and nausea.   Endocrine: Positive for heat intolerance (mild). Negative for cold intolerance.   Genitourinary: Positive for menstrual problem. Negative for dyspareunia, dysuria, pelvic pain and vaginal discharge.   Musculoskeletal: Negative for back pain.   Skin: Negative for color change and rash.   Neurological: Negative for headaches.   Hematological: Negative for adenopathy. Does not bruise/bleed easily.   Psychiatric/Behavioral: Negative for dysphoric mood. The patient is not nervous/anxious.        /62   Ht 154.9 cm (60.98\")   Wt 46.4 kg (102 lb 6.4 oz)   LMP 09/28/2019 " (LMP Unknown)   Breastfeeding? No   BMI 19.36 kg/m²     Physical Exam   Constitutional: She is oriented to person, place, and time. She appears well-developed and well-nourished.   HENT:   Head: Normocephalic and atraumatic.   Eyes: Conjunctivae are normal. No scleral icterus.   Neck: Neck supple. No thyromegaly present.   Cardiovascular: Normal rate and regular rhythm.   Pulmonary/Chest: Effort normal and breath sounds normal. Right breast exhibits no inverted nipple, no mass, no nipple discharge, no skin change and no tenderness. Left breast exhibits no inverted nipple, no mass, no nipple discharge, no skin change and no tenderness.   B implants noted   Abdominal: Soft. Bowel sounds are normal. She exhibits no distension and no mass. There is no tenderness. There is no rebound and no guarding. No hernia.   Genitourinary: Pelvic exam was performed with patient supine. There is no rash, tenderness or lesion on the right labia. There is no rash, tenderness or lesion on the left labia. Uterus is tender. Uterus is not deviated, not enlarged and not fixed. Cervix exhibits no motion tenderness, no discharge and no friability. Right adnexum displays no mass, no tenderness and no fullness. Left adnexum displays no mass, no tenderness and no fullness. No erythema, tenderness or bleeding in the vagina. No foreign body in the vagina. No signs of injury around the vagina. No vaginal discharge found.   Neurological: She is alert and oriented to person, place, and time.   Skin: Skin is warm and dry.   Psychiatric: She has a normal mood and affect. Her behavior is normal. Judgment and thought content normal.   Nursing note and vitals reviewed.         Assessment/Plan    1) GYN HM: pap/HPV  SBE demonstrated and encouraged.  2) STD screening: declines Condoms encouraged.  3) Bone health - Weight bearing exercise, dietary calcium recommendations and vitamin D reviewed.   4) Diet and Exercise discussed  5) Smoking Status: No  6)  Episode DUB-bleeding has resolved.  We did discuss menstrual cycles and the changes in the perimenopause.  Her ultrasound is normal.  She is advised that if she has additional episodes of bleeding she should call for Lysteda or additional work-up.  7)MMG: UTD 12/2018 normal, repeat 12/2019  8) DEXA-plan age 54  9)C scope- plans Cologard. Pt is aware that tests are not equivalent in the detection of colon cancer.   10) PMDD- no issues per pt. Declines meds  11) Adenomyosis- exam stable.    Follow up prn and 1 year       Purnima was seen today for gynecologic exam.    Diagnoses and all orders for this visit:    Pap smear, low-risk  -     POC Pregnancy, Urine  -     POC Urinalysis Dipstick  -     Pap IG, HPV-hr    Routine gynecological examination  -     POC Pregnancy, Urine  -     POC Urinalysis Dipstick  -     Pap IG, HPV-hr    Special screening examination for human papillomavirus (HPV)  -     POC Pregnancy, Urine  -     POC Urinalysis Dipstick  -     Pap IG, HPV-hr    Screening for condition  -     Mammo Screening Bilateral With CAD; Future  -     Cologuard - Stool, Per Rectum; Future        Lori Benito MD  10/14/2019    1:31 PM

## 2019-10-15 ENCOUNTER — TRANSCRIBE ORDERS (OUTPATIENT)
Dept: ADMINISTRATIVE | Facility: HOSPITAL | Age: 49
End: 2019-10-15

## 2019-10-15 DIAGNOSIS — Z12.31 SCREENING MAMMOGRAM, ENCOUNTER FOR: Primary | ICD-10-CM

## 2019-10-16 LAB
CYTOLOGIST CVX/VAG CYTO: NORMAL
CYTOLOGY CVX/VAG DOC CYTO: NORMAL
CYTOLOGY CVX/VAG DOC THIN PREP: NORMAL
DX ICD CODE: NORMAL
HIV 1 & 2 AB SER-IMP: NORMAL
HPV I/H RISK 1 DNA CVX QL PROBE+SIG AMP: NEGATIVE
OTHER STN SPEC: NORMAL
STAT OF ADQ CVX/VAG CYTO-IMP: NORMAL

## 2019-12-12 ENCOUNTER — HOSPITAL ENCOUNTER (OUTPATIENT)
Dept: MAMMOGRAPHY | Facility: HOSPITAL | Age: 49
Discharge: HOME | End: 2019-12-12
Admitting: OBSTETRICS & GYNECOLOGY

## 2019-12-12 DIAGNOSIS — Z12.31 SCREENING MAMMOGRAM, ENCOUNTER FOR: ICD-10-CM

## 2019-12-12 PROCEDURE — 77067 SCR MAMMO BI INCL CAD: CPT

## 2019-12-12 PROCEDURE — 77063 BREAST TOMOSYNTHESIS BI: CPT

## 2020-06-26 DIAGNOSIS — G47.00 INSOMNIA, UNSPECIFIED TYPE: ICD-10-CM

## 2020-06-29 RX ORDER — AMITRIPTYLINE HYDROCHLORIDE 10 MG/1
TABLET, FILM COATED ORAL
Qty: 120 TABLET | Refills: 1 | Status: SHIPPED | OUTPATIENT
Start: 2020-06-29 | End: 2020-08-31

## 2020-07-22 RX ORDER — SUMATRIPTAN 100 MG/1
100 TABLET, FILM COATED ORAL ONCE AS NEEDED
Qty: 9 TABLET | Refills: 11 | Status: SHIPPED | OUTPATIENT
Start: 2020-07-22 | End: 2020-09-28 | Stop reason: SDUPTHER

## 2020-08-30 DIAGNOSIS — G47.00 INSOMNIA, UNSPECIFIED TYPE: ICD-10-CM

## 2020-08-31 RX ORDER — AMITRIPTYLINE HYDROCHLORIDE 10 MG/1
TABLET, FILM COATED ORAL
Qty: 60 TABLET | Refills: 0 | Status: SHIPPED | OUTPATIENT
Start: 2020-08-31 | End: 2020-09-28 | Stop reason: SDUPTHER

## 2020-09-25 DIAGNOSIS — G47.00 INSOMNIA, UNSPECIFIED TYPE: ICD-10-CM

## 2020-09-25 RX ORDER — AMITRIPTYLINE HYDROCHLORIDE 10 MG/1
TABLET, FILM COATED ORAL
Qty: 60 TABLET | Refills: 0 | OUTPATIENT
Start: 2020-09-25

## 2020-09-25 RX ORDER — AMITRIPTYLINE HYDROCHLORIDE 10 MG/1
40 TABLET, FILM COATED ORAL
Qty: 60 TABLET | Refills: 0 | Status: CANCELLED | OUTPATIENT
Start: 2020-09-25

## 2020-09-25 NOTE — TELEPHONE ENCOUNTER
PATIENT IS NEEDING TO GET A REFILL ON amitriptyline (ELAVIL) 10 MG tablet    PHARMACY TOLD THE PATIENT THAT THE OFFICE WILL NOT REFILL THE MEDICATION.    PATIENT DO HAVE APPOINTMENT FOR 9-28-20     PATIENT IS OUT OF THIS MEDICATION      [id number] - [place], [place] - [address] [address] AT [facility] [address] - [phone] ph - [phone] FX       CONTACT PATIENT LET KNOW MEDICATION HAS BEEN SENT TO THE PHARMACY @[phone]

## 2020-09-28 ENCOUNTER — OFFICE VISIT (OUTPATIENT)
Dept: INTERNAL MEDICINE | Facility: CLINIC | Age: 50
End: 2020-09-28

## 2020-09-28 VITALS
DIASTOLIC BLOOD PRESSURE: 68 MMHG | HEART RATE: 77 BPM | HEIGHT: 62 IN | BODY MASS INDEX: 18.03 KG/M2 | SYSTOLIC BLOOD PRESSURE: 98 MMHG | OXYGEN SATURATION: 99 % | RESPIRATION RATE: 16 BRPM | WEIGHT: 98 LBS | TEMPERATURE: 97.5 F

## 2020-09-28 DIAGNOSIS — Z00.00 ROUTINE HEALTH MAINTENANCE: ICD-10-CM

## 2020-09-28 DIAGNOSIS — J45.20 MILD INTERMITTENT ASTHMA WITHOUT COMPLICATION: ICD-10-CM

## 2020-09-28 DIAGNOSIS — Z85.828 HISTORY OF BASAL CELL CARCINOMA OF SKIN: ICD-10-CM

## 2020-09-28 DIAGNOSIS — K58.1 IRRITABLE BOWEL SYNDROME WITH CONSTIPATION: ICD-10-CM

## 2020-09-28 DIAGNOSIS — G43.909 MIGRAINE WITHOUT STATUS MIGRAINOSUS, NOT INTRACTABLE, UNSPECIFIED MIGRAINE TYPE: Primary | ICD-10-CM

## 2020-09-28 DIAGNOSIS — N92.0 MENORRHAGIA WITH REGULAR CYCLE: ICD-10-CM

## 2020-09-28 DIAGNOSIS — G47.00 INSOMNIA, UNSPECIFIED TYPE: ICD-10-CM

## 2020-09-28 PROCEDURE — 99214 OFFICE O/P EST MOD 30 MIN: CPT | Performed by: FAMILY MEDICINE

## 2020-09-28 RX ORDER — AMITRIPTYLINE HYDROCHLORIDE 10 MG/1
50 TABLET, FILM COATED ORAL
Qty: 150 TABLET | Refills: 11 | Status: SHIPPED | OUTPATIENT
Start: 2020-09-28 | End: 2021-01-20 | Stop reason: SDUPTHER

## 2020-09-28 RX ORDER — MELATONIN 200 MCG
TABLET ORAL
COMMUNITY
End: 2021-01-20 | Stop reason: SDUPTHER

## 2020-09-28 RX ORDER — SUMATRIPTAN 100 MG/1
100 TABLET, FILM COATED ORAL ONCE AS NEEDED
Qty: 9 TABLET | Refills: 11 | Status: SHIPPED | OUTPATIENT
Start: 2020-09-28 | End: 2021-01-20 | Stop reason: SDUPTHER

## 2020-09-28 NOTE — PROGRESS NOTES
Subjective     Purnima Altamirano is a 50 y.o. female, who presents with a chief complaint of   Chief Complaint   Patient presents with   • Fatigue   • Insomnia   • Headache       Migraine   Associated symptoms include insomnia.   Fatigue  Associated symptoms include fatigue and headaches.   Insomnia  Associated symptoms include fatigue and headaches.   Headache   Associated symptoms include insomnia.      1. Migraines.  She has been getting more frequent migraines.  She thinks they are hormonal and are premenstrual.  Sumatriptan is effective.    2. Heavy menses.  Her menses are regular every 23-25 days.  She is planning to f/u with Dr. Benito for her annual gyn exam.    3. Insomnia.  This is bothering her quite a bit.  She has difficulty falling asleep.  She thinks this is hormonal as it is cyclical prior to menses. The amitriptyline is less effective than previously.    The following portions of the patient's history were reviewed and updated as appropriate: allergies, current medications, past family history, past medical history, past social history, past surgical history and problem list.    Allergies: Cefdinir    Review of Systems   Constitutional: Positive for fatigue.   Eyes: Negative.    Respiratory: Negative.    Cardiovascular: Negative.    Gastrointestinal: Negative.    Endocrine: Negative.    Genitourinary: Positive for menstrual problem (heavy menses).   Musculoskeletal: Negative.    Skin: Negative.    Allergic/Immunologic: Negative.    Neurological: Positive for headaches.   Hematological: Negative.    Psychiatric/Behavioral: Positive for sleep disturbance. The patient has insomnia.        Objective     Wt Readings from Last 3 Encounters:   09/28/20 44.5 kg (98 lb)   10/14/19 46.4 kg (102 lb 6.4 oz)   01/25/19 47.6 kg (105 lb)     Temp Readings from Last 3 Encounters:   09/28/20 97.5 °F (36.4 °C) (Temporal)   01/25/19 98 °F (36.7 °C) (Oral)   07/23/18 98.1 °F (36.7 °C)     BP Readings from Last 3  Encounters:   09/28/20 98/68   10/14/19 100/62   01/25/19 108/68     Pulse Readings from Last 3 Encounters:   09/28/20 77   01/25/19 83   07/23/18 81     Body mass index is 17.92 kg/m².    Physical Exam   Constitutional: She is oriented to person, place, and time. She appears well-developed.   HENT:   Head: Normocephalic and atraumatic.   Eyes: Conjunctivae are normal.   Neck: Neck supple. No thyromegaly present.   Cardiovascular: Normal rate, regular rhythm and normal heart sounds.   Pulmonary/Chest: Effort normal and breath sounds normal.   Abdominal: Soft. Bowel sounds are normal.   Musculoskeletal: Normal range of motion.   Lymphadenopathy:     She has no cervical adenopathy.   Neurological: She is alert and oriented to person, place, and time.   Skin: Skin is warm and dry. No rash noted.   Psychiatric: Her behavior is normal.   Nursing note and vitals reviewed.      Assessment/Plan   Purnima was seen today for fatigue, insomnia and headache.    Diagnoses and all orders for this visit:    Migraine without status migrainosus, not intractable, unspecified migraine type    History of basal cell carcinoma of skin    Insomnia, unspecified type  -     amitriptyline (ELAVIL) 10 MG tablet; Take 5 tablets by mouth every night at bedtime.  -     Comprehensive Metabolic Panel; Future    Mild intermittent asthma without complication    Menorrhagia with regular cycle  -     CBC & Differential; Future  -     TSH; Future  -     Ferritin; Future    Routine health maintenance  -     Lipid Panel With / Chol / HDL Ratio; Future  -     Hepatitis C Antibody; Future  -     Vitamin B12; Future  -     Vitamin D 25 Hydroxy; Future    Irritable bowel syndrome with constipation    Other orders  -     SUMAtriptan (IMITREX) 100 MG tablet; Take 1 tablet by mouth 1 (One) Time As Needed for Migraine for up to 1 dose.      1. Migraines. Continue prn sumatriptan.    2. History of basal cell carcinoma.  Followed by Dr. Green.    3. Insomnia.   Not optimally controlled.  Increase amitriptyline to 50 mg during the premenstrual week.  Lifestyle measures discussed.    4. Asthma.  No flare.     5. Menorrhagia.  Labs ordered.  She is followed by Dr. Benito.    6. Routine health maint.  She is considering flu vaccine.  She says she did Cologuard last year but we don't have the results; will attempt to obtain these.  Mammogram due in December; per Dr. Benito.    7. IBS with constipation.  Pt adopted a gluten free diet several months ago and reports she is feeling much better.    Current Outpatient Medications:   •  amitriptyline (ELAVIL) 10 MG tablet, Take 5 tablets by mouth every night at bedtime., Disp: 150 tablet, Rfl: 11  •  ibuprofen (ADVIL,MOTRIN) 600 MG tablet, Take 1 tablet by mouth Every 8 (Eight) Hours As Needed for Mild Pain ., Disp: 30 tablet, Rfl: 0  •  Melatonin 3 MG tablet, Take  by mouth., Disp: , Rfl:   •  SUMAtriptan (IMITREX) 100 MG tablet, Take 1 tablet by mouth 1 (One) Time As Needed for Migraine for up to 1 dose., Disp: 9 tablet, Rfl: 11  Medications Discontinued During This Encounter   Medication Reason   • amitriptyline (ELAVIL) 10 MG tablet Reorder   • SUMAtriptan (IMITREX) 100 MG tablet Reorder       Return in about 1 year (around 9/28/2021).

## 2020-09-30 ENCOUNTER — TELEPHONE (OUTPATIENT)
Dept: INTERNAL MEDICINE | Facility: CLINIC | Age: 50
End: 2020-09-30

## 2020-09-30 DIAGNOSIS — R19.5 POSITIVE COLORECTAL CANCER SCREENING USING COLOGUARD TEST: Primary | ICD-10-CM

## 2020-09-30 NOTE — TELEPHONE ENCOUNTER
Discussed positive Cologuard result with pt.  Will refer for colonoscopy.    ----- Message from Cande Salcedo MA sent at 9/29/2020 12:29 PM EDT -----  Ordered by Dr. Benito.  Result is on your desk.  ----- Message -----  From: Trace Min MD  Sent: 9/28/2020   5:04 PM EDT  To: Cande Salcedo MA    Could you please check on her Cologuard?  She sent it in Oct or Nov 2019 and there is no result in the chart.

## 2020-10-07 ENCOUNTER — OFFICE VISIT (OUTPATIENT)
Dept: GASTROENTEROLOGY | Facility: CLINIC | Age: 50
End: 2020-10-07

## 2020-10-07 VITALS — HEIGHT: 62 IN | WEIGHT: 100 LBS | TEMPERATURE: 97.8 F | BODY MASS INDEX: 18.4 KG/M2

## 2020-10-07 DIAGNOSIS — R19.5 POSITIVE COLORECTAL CANCER SCREENING USING COLOGUARD TEST: ICD-10-CM

## 2020-10-07 DIAGNOSIS — Z12.11 ENCOUNTER FOR SCREENING FOR MALIGNANT NEOPLASM OF COLON: Primary | ICD-10-CM

## 2020-10-07 PROCEDURE — 99214 OFFICE O/P EST MOD 30 MIN: CPT | Performed by: NURSE PRACTITIONER

## 2020-10-07 NOTE — PROGRESS NOTES
PATIENT INFORMATION  Purnima Altamirano       - 1970    CHIEF COMPLAINT  Chief Complaint   Patient presents with   • Abnormal Lab     positive cologuard       HISTORY OF PRESENT ILLNESS  15yrs ago got sick from water in [facility].  Saw Dr. Russell and had colonoscopy and tx with abx. No polyps.  Her chronic constipation is well controlled with diet at this point.     Pos cologuard brought her in today.  No family hx of colon cancer or polyps with screenings. No blood in stool or melana other indications.       REVIEWED PERTINENT RESULTS/ LABS  No results found for: CASEREPORT, FINALDX  Lab Results   Component Value Date    HGB 14.5 2018    MCV 98.6 2018     2018    ALT 20 2018    AST 24 2018    TRIG 36 2018    FERRITIN 23.97 2017      No results found.    REVIEW OF SYSTEMS  Review of Systems   All other systems reviewed and are negative.        ACTIVE PROBLEMS  Patient Active Problem List    Diagnosis   • Positive colorectal cancer screening using Cologuard test [R19.5]   • IBS (irritable bowel syndrome) [K58.9]   • Adenomyosis [N80.0]   • History of basal cell carcinoma of skin [Z85.828]   • Mild intermittent asthma [J45.20]   • Menorrhagia with regular cycle [N92.0]   • Fatigue [R53.83]   • Migraines [G43.909]   • Insomnia [G47.00]   • Irritable bowel [K58.9]   • Routine health maintenance [Z00.00]         PAST MEDICAL HISTORY  Past Medical History:   Diagnosis Date   • Abnormal Pap smear of cervix    • Adenomyosis    • Cervical dysplasia     Status post laser with normal follow-ups   • IBS (irritable bowel syndrome)    • PMS (premenstrual syndrome)          SURGICAL HISTORY  Past Surgical History:   Procedure Laterality Date   • AUGMENTATION MAMMAPLASTY Bilateral    • CERVIX LESION DESTRUCTION     • COLONOSCOPY      4 or 5 years ago Dr. Russell    • KNEE SURGERY Left    • MAMMO BILATERAL  2016         FAMILY HISTORY  Family History   Problem Relation Age  "of Onset   • Stroke Maternal Grandmother    • Breast cancer Neg Hx    • Ovarian cancer Neg Hx    • Colon cancer Neg Hx    • Deep vein thrombosis Neg Hx    • Pulmonary embolism Neg Hx    • Colon polyps Neg Hx          SOCIAL HISTORY  Social History     Occupational History   • Not on file   Tobacco Use   • Smoking status: Never Smoker   • Smokeless tobacco: Never Used   Substance and Sexual Activity   • Alcohol use: No   • Drug use: No   • Sexual activity: Yes     Partners: Male     Birth control/protection: Condom         CURRENT MEDICATIONS    Current Outpatient Medications:   •  amitriptyline (ELAVIL) 10 MG tablet, Take 5 tablets by mouth every night at bedtime., Disp: 150 tablet, Rfl: 11  •  ibuprofen (ADVIL,MOTRIN) 600 MG tablet, Take 1 tablet by mouth Every 8 (Eight) Hours As Needed for Mild Pain ., Disp: 30 tablet, Rfl: 0  •  Melatonin 3 MG tablet, Take  by mouth., Disp: , Rfl:   •  SUMAtriptan (IMITREX) 100 MG tablet, Take 1 tablet by mouth 1 (One) Time As Needed for Migraine for up to 1 dose., Disp: 9 tablet, Rfl: 11    ALLERGIES  Cefdinir    VITALS  Vitals:    10/07/20 1132   Temp: 97.8 °F (36.6 °C)   TempSrc: Temporal   Weight: 45.4 kg (100 lb)   Height: 157.5 cm (62.01\")       PHYSICAL EXAM  Debilities/Disabilities Identified: None  Emotional Behavior: Appropriate  Wt Readings from Last 3 Encounters:   10/07/20 45.4 kg (100 lb)   09/28/20 44.5 kg (98 lb)   10/14/19 46.4 kg (102 lb 6.4 oz)     Ht Readings from Last 1 Encounters:   10/07/20 157.5 cm (62.01\")     Body mass index is 18.29 kg/m².  Physical Exam  Vitals signs and nursing note reviewed.   Constitutional:       Appearance: She is well-developed.   HENT:      Head: Normocephalic and atraumatic.   Eyes:      Conjunctiva/sclera: Conjunctivae normal.      Pupils: Pupils are equal, round, and reactive to light.   Neck:      Musculoskeletal: Normal range of motion and neck supple.   Cardiovascular:      Rate and Rhythm: Normal rate and regular rhythm. "   Pulmonary:      Effort: Pulmonary effort is normal.      Breath sounds: Normal breath sounds.   Abdominal:      General: Bowel sounds are normal.      Palpations: Abdomen is soft.      Tenderness: There is no abdominal tenderness.   Musculoskeletal: Normal range of motion.   Lymphadenopathy:      Cervical: No cervical adenopathy.   Skin:     General: Skin is warm and dry.   Neurological:      Mental Status: She is alert and oriented to person, place, and time.   Psychiatric:         Behavior: Behavior normal.         CLINICAL DATA REVIEWED   reviewed previous lab results and integrated with today's visit, reviewed notes from other physicians and/or last GI encounter, reviewed previous endoscopy results and available photos, reviewed surgical pathology results from previous biopsies    ASSESSMENT  Diagnoses and all orders for this visit:    Encounter for screening for malignant neoplasm of colon    Positive colorectal cancer screening using Cologuard test          PLAN  Return if symptoms worsen or fail to improve.     Take a daily probiotic for your gut as well.    Drink lots of water, eat a high fiber diet with 25-30gm a day(check the fiber content in the foods you eat.  Protein bars with 15gm of fiber in each bar are a great supplement daily), and get regular exercise.     Don't eat late, don't eat fried and don't eat too much at one time. Avoid tomato based products like pizza, lasagna, spagetti.  If you want to have these take an over the counter Pepcid immediately before you eat them.  Do not eat within 3-4 hrs of bedtime. Limit caffeine and carbonated beverages, if you must have them do not have later in the day.  If reflux is severe elevate the head of the bed. You may also use TUMS, maalox, or mylanta for breakthrough heartburn.      I have discussed the above plan with the patient.  They verbalize understanding and are in agreement with the plan.  They have been advised to contact the office for any  questions, concerns, or changes related to their health.

## 2020-10-07 NOTE — PATIENT INSTRUCTIONS
Take a daily probiotic for your gut as well.    Drink lots of water, eat a high fiber diet with 25-30gm a day(check the fiber content in the foods you eat.  Protein bars with 15gm of fiber in each bar are a great supplement daily), and get regular exercise.     Don't eat late, don't eat fried and don't eat too much at one time. Avoid tomato based products like pizza, lasagna, spagetti.  If you want to have these take an over the counter Pepcid immediately before you eat them.  Do not eat within 3-4 hrs of bedtime. Limit caffeine and carbonated beverages, if you must have them do not have later in the day.  If reflux is severe elevate the head of the bed. You may also use TUMS, maalox, or mylanta for breakthrough heartburn.

## 2020-10-08 ENCOUNTER — HOSPITAL ENCOUNTER (OUTPATIENT)
Facility: HOSPITAL | Age: 50
Setting detail: HOSPITAL OUTPATIENT SURGERY
End: 2020-10-08
Attending: INTERNAL MEDICINE | Admitting: INTERNAL MEDICINE

## 2020-10-08 PROBLEM — Z12.11 ENCOUNTER FOR SCREENING FOR MALIGNANT NEOPLASM OF COLON: Status: ACTIVE | Noted: 2020-10-08

## 2020-10-21 ENCOUNTER — OFFICE VISIT (OUTPATIENT)
Dept: OBSTETRICS AND GYNECOLOGY | Facility: CLINIC | Age: 50
End: 2020-10-21

## 2020-10-21 ENCOUNTER — PROCEDURE VISIT (OUTPATIENT)
Dept: OBSTETRICS AND GYNECOLOGY | Facility: CLINIC | Age: 50
End: 2020-10-21

## 2020-10-21 VITALS
BODY MASS INDEX: 18.88 KG/M2 | HEIGHT: 61 IN | SYSTOLIC BLOOD PRESSURE: 110 MMHG | WEIGHT: 100 LBS | DIASTOLIC BLOOD PRESSURE: 62 MMHG

## 2020-10-21 DIAGNOSIS — R10.2 PELVIC PAIN: ICD-10-CM

## 2020-10-21 DIAGNOSIS — N80.03 ADENOMYOSIS: ICD-10-CM

## 2020-10-21 DIAGNOSIS — R10.2 PELVIC PAIN IN FEMALE: Primary | ICD-10-CM

## 2020-10-21 DIAGNOSIS — Z01.419 ROUTINE GYNECOLOGICAL EXAMINATION: Primary | ICD-10-CM

## 2020-10-21 PROCEDURE — 99213 OFFICE O/P EST LOW 20 MIN: CPT | Performed by: OBSTETRICS & GYNECOLOGY

## 2020-10-21 PROCEDURE — 99396 PREV VISIT EST AGE 40-64: CPT | Performed by: OBSTETRICS & GYNECOLOGY

## 2020-10-21 PROCEDURE — 76830 TRANSVAGINAL US NON-OB: CPT | Performed by: OBSTETRICS & GYNECOLOGY

## 2020-10-21 PROCEDURE — 81025 URINE PREGNANCY TEST: CPT | Performed by: OBSTETRICS & GYNECOLOGY

## 2020-10-21 PROCEDURE — 81002 URINALYSIS NONAUTO W/O SCOPE: CPT | Performed by: OBSTETRICS & GYNECOLOGY

## 2020-10-21 NOTE — PROGRESS NOTES
GYN Annual Exam     CC- Here for annual exam.     Purnima Altamirano is a 50 y.o. female established patient who presents for annual well woman exam. Her cycles are every 21-28 days and are heavy. She still has significant PMDD but declines treatment for either issue. She is concerned about an episode last week where she had intense pelvic pain that brought her to her knees. It did not last long but caused intense rectal pressure and vaginal pain. Her TVUS today shows a 9 cm uterus with an EL 1.27 cm and normal ovaries. This is compared to her scan on 10/14/2019. We again discussed possible treatment options for menorrhagia and PMDD but she declines.  She had a + Cologuard ( the results never came here and I still don't see them in the computer but Dr Min tracked them down) and has a C scope scheduled for 2021. Her older sister just had an MI and is also seemingly very healthy so we discussed vascular screening for her. She has labs scheduled through her primary MD office    OB History        2    Para   2    Term   2            AB        Living   2       SAB        TAB        Ectopic        Molar        Multiple        Live Births              Obstetric Comments   2              Menarche:12  Menopause:not yet  HRT:none  Current contraception: condoms  History of abnormal Pap smear: yes -  s/p cryo and normal followup   History of abnormal mammogram: no  Family history of uterine, colon or ovarian cancer: no  Family history of breast cancer: no  STD's: none  Last pap smear:10/2019 nl pap/HPV  Gardasil: none  HALEY: none   Adenomyosis  PMDD      Health Maintenance   Topic Date Due   • COLONOSCOPY  1969   • ANNUAL PHYSICAL  1972   • Pneumococcal Vaccine 0-64 (1 of 1 - PPSV23) 1975   • TDAP/TD VACCINES (1 - Tdap) 1988   • HEPATITIS C SCREENING  2016   • ZOSTER VACCINE (1 of 2) 2019   • Annual Gynecologic Pelvic and Breast Exam  10/15/2020   • INFLUENZA VACCINE   09/28/2021 (Originally 8/1/2020)   • MAMMOGRAM  12/12/2021   • PAP SMEAR  10/14/2022       Past Medical History:   Diagnosis Date   • Abnormal Pap smear of cervix    • Adenomyosis    • Cervical dysplasia     Status post laser with normal follow-ups   • IBS (irritable bowel syndrome)    • PMS (premenstrual syndrome)        Past Surgical History:   Procedure Laterality Date   • AUGMENTATION MAMMAPLASTY Bilateral    • CERVIX LESION DESTRUCTION     • COLONOSCOPY      4 or 5 years ago Dr. Russell    • KNEE SURGERY Left    • MAMMO BILATERAL  01/2016         Current Outpatient Medications:   •  amitriptyline (ELAVIL) 10 MG tablet, Take 5 tablets by mouth every night at bedtime., Disp: 150 tablet, Rfl: 11  •  ibuprofen (ADVIL,MOTRIN) 600 MG tablet, Take 1 tablet by mouth Every 8 (Eight) Hours As Needed for Mild Pain ., Disp: 30 tablet, Rfl: 0  •  Melatonin 3 MG tablet, Take  by mouth., Disp: , Rfl:   •  SUMAtriptan (IMITREX) 100 MG tablet, Take 1 tablet by mouth 1 (One) Time As Needed for Migraine for up to 1 dose., Disp: 9 tablet, Rfl: 11    Allergies   Allergen Reactions   • Cefdinir        Social History     Tobacco Use   • Smoking status: Never Smoker   • Smokeless tobacco: Never Used   Substance Use Topics   • Alcohol use: No   • Drug use: No       Family History   Problem Relation Age of Onset   • Stroke Maternal Grandmother    • Heart attack Mother    • Heart attack Sister    • Breast cancer Neg Hx    • Ovarian cancer Neg Hx    • Colon cancer Neg Hx    • Deep vein thrombosis Neg Hx    • Pulmonary embolism Neg Hx    • Colon polyps Neg Hx        Review of Systems   Constitutional: Positive for activity change. Negative for appetite change, fatigue, fever and unexpected weight change.   Eyes: Negative for photophobia and visual disturbance.   Respiratory: Negative for cough and shortness of breath.    Cardiovascular: Negative for chest pain and palpitations.   Gastrointestinal: Positive for rectal pain. Negative for  "abdominal distention, abdominal pain, constipation, diarrhea and nausea.   Endocrine: Positive for heat intolerance (mild). Negative for cold intolerance.   Genitourinary: Positive for menstrual problem and pelvic pain. Negative for dyspareunia, dysuria, vaginal bleeding and vaginal discharge.   Musculoskeletal: Negative for back pain.   Skin: Negative for color change and rash.   Neurological: Negative for headaches.   Hematological: Negative for adenopathy. Does not bruise/bleed easily.   Psychiatric/Behavioral: Negative for dysphoric mood. The patient is not nervous/anxious.        /62   Ht 154.9 cm (61\")   Wt 45.4 kg (100 lb)   LMP 09/27/2020   Breastfeeding No   BMI 18.89 kg/m²     Physical Exam   Constitutional: She is oriented to person, place, and time. She appears well-developed.   HENT:   Head: Normocephalic and atraumatic.   Eyes: Conjunctivae are normal. No scleral icterus.   Neck: Neck supple. No thyromegaly present.   Cardiovascular: Normal rate, regular rhythm and normal heart sounds.   Pulmonary/Chest: Effort normal and breath sounds normal. Right breast exhibits no inverted nipple, no mass, no nipple discharge, no skin change and no tenderness. Left breast exhibits no inverted nipple, no mass, no nipple discharge, no skin change and no tenderness.   B implants noted   Abdominal: Soft. Normal appearance and bowel sounds are normal. She exhibits no distension and no mass. There is no abdominal tenderness. There is no rebound and no guarding. No hernia.   Genitourinary:    Rectum normal.      Pelvic exam was performed with patient supine.   There is no rash, tenderness, lesion or injury on the right labia. There is no rash, tenderness, lesion or injury on the left labia. Uterus is tender. Uterus is not deviated, not enlarged and not fixed. Cervix exhibits no motion tenderness, no lesion, no discharge and no friability. Right adnexum displays no mass, no tenderness and no fullness. Left " adnexum displays no mass, no tenderness and no fullness.    No vaginal discharge, erythema, tenderness or bleeding.   No erythema, tenderness or bleeding in the vagina.    No foreign body in the vagina.      No signs of injury or lesions in the vagina.      Genitourinary Comments: Exam c/w adenomyosis     Neurological: She is alert and oriented to person, place, and time.   Skin: Skin is warm and dry.   Psychiatric: Her behavior is normal. Judgment and thought content normal.   Nursing note and vitals reviewed.         Assessment/Plan    1) GYN HM: normal pap/HPV 10/2019 SBE demonstrated and encouraged.  2) STD screening: declines Condoms encouraged.  3) Bone health - Weight bearing exercise, dietary calcium recommendations and vitamin D reviewed.   4) Diet and Exercise discussed  5) Smoking Status: No  6) Episode of pelvic pain- TVUS normal today. Pt advised to call back for any additional episodes.  7)MMG: UTD 12/2019 normal, repeat 12/2019B1, repeat 12/2020  9)C scope- positive Cologard, has C scope scheduled  10) PMDD- no issues per pt. Declines meds  11) Adenomyosis- exam stable. Pt declines treatment.   12) Parts of this document have been copied or forwarded from her previous visits and have been reviewed, updated and edited as indicated.   13)I saw the patient with a face mask, gloves and eye protection  The patient herself was masked.  Social distancing was observed as appropriate.  14)Follow up prn and 1 year       Diagnoses and all orders for this visit:    1. Routine gynecological examination (Primary)  -     POC Urinalysis Dipstick  -     POC Pregnancy, Urine    2. Adenomyosis    3. Pelvic pain        Lori Benito MD  10/22/2020    09:02 EDT

## 2020-11-10 ENCOUNTER — TRANSCRIBE ORDERS (OUTPATIENT)
Dept: ADMINISTRATIVE | Facility: HOSPITAL | Age: 50
End: 2020-11-10

## 2020-11-10 DIAGNOSIS — Z12.31 SCREENING MAMMOGRAM, ENCOUNTER FOR: Primary | ICD-10-CM

## 2020-12-14 ENCOUNTER — HOSPITAL ENCOUNTER (OUTPATIENT)
Dept: MAMMOGRAPHY | Facility: HOSPITAL | Age: 50
Discharge: HOME | End: 2020-12-14
Admitting: OBSTETRICS & GYNECOLOGY

## 2020-12-14 DIAGNOSIS — Z12.31 SCREENING MAMMOGRAM, ENCOUNTER FOR: ICD-10-CM

## 2020-12-14 PROCEDURE — 77067 SCR MAMMO BI INCL CAD: CPT

## 2020-12-14 PROCEDURE — 77063 BREAST TOMOSYNTHESIS BI: CPT

## 2020-12-18 ENCOUNTER — LAB (OUTPATIENT)
Dept: INTERNAL MEDICINE | Facility: CLINIC | Age: 50
End: 2020-12-18

## 2020-12-18 DIAGNOSIS — G47.00 INSOMNIA, UNSPECIFIED TYPE: ICD-10-CM

## 2020-12-18 DIAGNOSIS — N92.0 MENORRHAGIA WITH REGULAR CYCLE: ICD-10-CM

## 2020-12-18 DIAGNOSIS — Z00.00 ROUTINE HEALTH MAINTENANCE: ICD-10-CM

## 2020-12-19 LAB
25(OH)D3+25(OH)D2 SERPL-MCNC: 73.1 NG/ML (ref 30–100)
ALBUMIN SERPL-MCNC: 4.2 G/DL (ref 3.5–5.2)
ALBUMIN/GLOB SERPL: 2.3 G/DL
ALP SERPL-CCNC: 36 U/L (ref 39–117)
ALT SERPL-CCNC: 20 U/L (ref 1–33)
AST SERPL-CCNC: 20 U/L (ref 1–32)
BASOPHILS # BLD AUTO: 0.03 10*3/MM3 (ref 0–0.2)
BASOPHILS NFR BLD AUTO: 0.9 % (ref 0–1.5)
BILIRUB SERPL-MCNC: 0.3 MG/DL (ref 0–1.2)
BUN SERPL-MCNC: 18 MG/DL (ref 6–20)
BUN/CREAT SERPL: 21.4 (ref 7–25)
CALCIUM SERPL-MCNC: 8.7 MG/DL (ref 8.6–10.5)
CHLORIDE SERPL-SCNC: 104 MMOL/L (ref 98–107)
CHOLEST SERPL-MCNC: 180 MG/DL (ref 0–200)
CHOLEST/HDLC SERPL: 2.09 {RATIO}
CO2 SERPL-SCNC: 26.9 MMOL/L (ref 22–29)
CREAT SERPL-MCNC: 0.84 MG/DL (ref 0.57–1)
EOSINOPHIL # BLD AUTO: 0.04 10*3/MM3 (ref 0–0.4)
EOSINOPHIL NFR BLD AUTO: 1.2 % (ref 0.3–6.2)
ERYTHROCYTE [DISTWIDTH] IN BLOOD BY AUTOMATED COUNT: 11.5 % (ref 12.3–15.4)
FERRITIN SERPL-MCNC: 71.1 NG/ML (ref 13–150)
GLOBULIN SER CALC-MCNC: 1.8 GM/DL
GLUCOSE SERPL-MCNC: 96 MG/DL (ref 65–99)
HCT VFR BLD AUTO: 40.4 % (ref 34–46.6)
HCV AB S/CO SERPL IA: <0.1 S/CO RATIO (ref 0–0.9)
HDLC SERPL-MCNC: 86 MG/DL (ref 40–60)
HGB BLD-MCNC: 13.8 G/DL (ref 12–15.9)
IMM GRANULOCYTES # BLD AUTO: 0.01 10*3/MM3 (ref 0–0.05)
IMM GRANULOCYTES NFR BLD AUTO: 0.3 % (ref 0–0.5)
LDLC SERPL CALC-MCNC: 86 MG/DL (ref 0–100)
LYMPHOCYTES # BLD AUTO: 0.71 10*3/MM3 (ref 0.7–3.1)
LYMPHOCYTES NFR BLD AUTO: 20.9 % (ref 19.6–45.3)
MCH RBC QN AUTO: 33.1 PG (ref 26.6–33)
MCHC RBC AUTO-ENTMCNC: 34.2 G/DL (ref 31.5–35.7)
MCV RBC AUTO: 96.9 FL (ref 79–97)
MONOCYTES # BLD AUTO: 0.29 10*3/MM3 (ref 0.1–0.9)
MONOCYTES NFR BLD AUTO: 8.5 % (ref 5–12)
NEUTROPHILS # BLD AUTO: 2.32 10*3/MM3 (ref 1.7–7)
NEUTROPHILS NFR BLD AUTO: 68.2 % (ref 42.7–76)
NRBC BLD AUTO-RTO: 0 /100 WBC (ref 0–0.2)
PLATELET # BLD AUTO: 176 10*3/MM3 (ref 140–450)
POTASSIUM SERPL-SCNC: 4.2 MMOL/L (ref 3.5–5.2)
PROT SERPL-MCNC: 6 G/DL (ref 6–8.5)
RBC # BLD AUTO: 4.17 10*6/MM3 (ref 3.77–5.28)
SODIUM SERPL-SCNC: 139 MMOL/L (ref 136–145)
TRIGL SERPL-MCNC: 36 MG/DL (ref 0–150)
TSH SERPL DL<=0.005 MIU/L-ACNC: 1.57 UIU/ML (ref 0.27–4.2)
VIT B12 SERPL-MCNC: 1989 PG/ML (ref 211–946)
VLDLC SERPL CALC-MCNC: 8 MG/DL (ref 5–40)
WBC # BLD AUTO: 3.4 10*3/MM3 (ref 3.4–10.8)

## 2021-01-04 ENCOUNTER — TRANSCRIBE ORDERS (OUTPATIENT)
Dept: ADMINISTRATIVE | Facility: HOSPITAL | Age: 51
End: 2021-01-04

## 2021-01-04 DIAGNOSIS — U07.1 COVID-19: Primary | ICD-10-CM

## 2021-01-06 ENCOUNTER — TELEPHONE (OUTPATIENT)
Dept: GASTROENTEROLOGY | Facility: CLINIC | Age: 51
End: 2021-01-06

## 2021-01-06 NOTE — TELEPHONE ENCOUNTER
CALL FROM PATIENT.  SCHEDULED ON 01/11/2021 FOR COLONOSCOPY, NEEDS TO RESCHEDULE.      RESCHEDULED TO 04/30/2021 AT 9:30AM - ARRIVE 8:30AM.  DOES NOT NEED NEW INSTRUCTIONS.  SHE CORRECTED HER COPY.    COVID TEST ON 04/28/2021, WILL CALL WITH TIME.  NEED TO SELF QUARANTINE UNTIL AFTER PROCEDURE.  SHE UNDERSTANDS.

## 2021-01-08 ENCOUNTER — APPOINTMENT (OUTPATIENT)
Dept: LAB | Facility: HOSPITAL | Age: 51
End: 2021-01-08

## 2021-01-12 ENCOUNTER — TELEPHONE (OUTPATIENT)
Dept: INTERNAL MEDICINE | Facility: CLINIC | Age: 51
End: 2021-01-12

## 2021-01-12 NOTE — TELEPHONE ENCOUNTER
Caller: Purnima Altamirano    Relationship: Self    Best call back number: [phone]    Caller requesting test results: Patient    What test was performed: Labs    When was the test performed: 12/18/20    Where was the test performed: [place]      Patient would like a call with the lab results.

## 2021-01-14 NOTE — TELEPHONE ENCOUNTER
CALLED PATIENT YESTERDAY.  SCHEDULED FOR COLONOSCOPY ON 04/30/2021.  NEED TO RESCHEDULE BECAUSE DR. JOSÉ WILL BE OUT OF TOWN.    RESCHEDULED TO [place] 05/17/2021 AT 8:45AM - ARRIVE 7:30AM.  WILL MAIL INSTRUCTIONS.     COVID TEST ON 05/14/2021, WILL CALL WITH TIME.  NEED TO SELF QUARANTINE UNTIL AFTER PROCEDURE.  SHE UNDERSTANDS.

## 2021-01-18 ENCOUNTER — TELEPHONE (OUTPATIENT)
Dept: OBSTETRICS AND GYNECOLOGY | Facility: CLINIC | Age: 51
End: 2021-01-18

## 2021-01-18 NOTE — TELEPHONE ENCOUNTER
Patient calling states she has bleeding since Dec. 30. She stated she not sure how heavy it is she doesn't want to go to the ER. Do you want to work her in somewhere?

## 2021-01-18 NOTE — TELEPHONE ENCOUNTER
ADAM! She would never call unless she was bleeding to death. Can I see her this Wed for US and visit?

## 2021-01-20 ENCOUNTER — OFFICE VISIT (OUTPATIENT)
Dept: OBSTETRICS AND GYNECOLOGY | Facility: CLINIC | Age: 51
End: 2021-01-20

## 2021-01-20 VITALS
WEIGHT: 99 LBS | HEIGHT: 61 IN | SYSTOLIC BLOOD PRESSURE: 98 MMHG | BODY MASS INDEX: 18.69 KG/M2 | DIASTOLIC BLOOD PRESSURE: 62 MMHG

## 2021-01-20 DIAGNOSIS — N92.1 MENOMETRORRHAGIA: Primary | ICD-10-CM

## 2021-01-20 DIAGNOSIS — Z13.9 SCREENING FOR CONDITION: ICD-10-CM

## 2021-01-20 DIAGNOSIS — N83.201 CYST OF RIGHT OVARY: ICD-10-CM

## 2021-01-20 LAB
BASOPHILS # BLD AUTO: 0.02 10*3/MM3 (ref 0–0.2)
BASOPHILS NFR BLD AUTO: 0.4 % (ref 0–1.5)
BILIRUB BLD-MCNC: NEGATIVE MG/DL
CLARITY, POC: CLEAR
COLOR UR: YELLOW
EOSINOPHIL # BLD AUTO: 0.05 10*3/MM3 (ref 0–0.4)
EOSINOPHIL NFR BLD AUTO: 1.1 % (ref 0.3–6.2)
ERYTHROCYTE [DISTWIDTH] IN BLOOD BY AUTOMATED COUNT: 11.8 % (ref 12.3–15.4)
GLUCOSE UR STRIP-MCNC: NEGATIVE MG/DL
HCT VFR BLD AUTO: 41.6 % (ref 34–46.6)
HGB BLD-MCNC: 14 G/DL (ref 12–15.9)
IMM GRANULOCYTES # BLD AUTO: 0.01 10*3/MM3 (ref 0–0.05)
IMM GRANULOCYTES NFR BLD AUTO: 0.2 % (ref 0–0.5)
KETONES UR QL: NEGATIVE
LEUKOCYTE EST, POC: NEGATIVE
LYMPHOCYTES # BLD AUTO: 0.62 10*3/MM3 (ref 0.7–3.1)
LYMPHOCYTES NFR BLD AUTO: 13.9 % (ref 19.6–45.3)
MCH RBC QN AUTO: 32.6 PG (ref 26.6–33)
MCHC RBC AUTO-ENTMCNC: 33.7 G/DL (ref 31.5–35.7)
MCV RBC AUTO: 97 FL (ref 79–97)
MONOCYTES # BLD AUTO: 0.31 10*3/MM3 (ref 0.1–0.9)
MONOCYTES NFR BLD AUTO: 6.9 % (ref 5–12)
NEUTROPHILS # BLD AUTO: 3.46 10*3/MM3 (ref 1.7–7)
NEUTROPHILS NFR BLD AUTO: 77.5 % (ref 42.7–76)
NITRITE UR-MCNC: NEGATIVE MG/ML
NRBC BLD AUTO-RTO: 0 /100 WBC (ref 0–0.2)
PH UR: 5 [PH] (ref 5–8)
PLATELET # BLD AUTO: 213 10*3/MM3 (ref 140–450)
PROT UR STRIP-MCNC: NEGATIVE MG/DL
RBC # BLD AUTO: 4.29 10*6/MM3 (ref 3.77–5.28)
RBC # UR STRIP: NEGATIVE /UL
SP GR UR: 1 (ref 1–1.03)
UROBILINOGEN UR QL: NORMAL
WBC # BLD AUTO: 4.47 10*3/MM3 (ref 3.4–10.8)

## 2021-01-20 PROCEDURE — 99213 OFFICE O/P EST LOW 20 MIN: CPT | Performed by: OBSTETRICS & GYNECOLOGY

## 2021-01-20 PROCEDURE — 58100 BIOPSY OF UTERUS LINING: CPT | Performed by: OBSTETRICS & GYNECOLOGY

## 2021-01-20 PROCEDURE — 81002 URINALYSIS NONAUTO W/O SCOPE: CPT | Performed by: OBSTETRICS & GYNECOLOGY

## 2021-01-20 RX ORDER — MELATONIN 200 MCG
TABLET ORAL
COMMUNITY
End: 2021-07-12

## 2021-01-20 RX ORDER — SUMATRIPTAN 100 MG/1
100 TABLET, FILM COATED ORAL
COMMUNITY
Start: 2020-09-28 | End: 2021-07-12

## 2021-01-20 RX ORDER — AMITRIPTYLINE HYDROCHLORIDE 10 MG/1
TABLET, FILM COATED ORAL
COMMUNITY
Start: 2020-09-28 | End: 2021-10-04 | Stop reason: SDUPTHER

## 2021-01-20 RX ORDER — GABAPENTIN 400 MG/1
CAPSULE ORAL
COMMUNITY
Start: 2020-12-21 | End: 2021-01-20

## 2021-01-20 NOTE — PROGRESS NOTES
"      Purnima Altamirano is a 51 y.o. patient who presents for follow up of   Chief Complaint   Patient presents with   • Vaginal Bleeding       51-year-old established patient here for emergency appointment for vaginal bleeding.  She started a regular cycle on 12/30/2020 and then bled continuously until yesterday.  She has only spotting today.  She said her bleeding was very heavy and she is passing clots.  She denies any weakness or dizziness.  She had a normal hemoglobin at 13.1 and a normal TSH in December 2020.  Her ultrasound today shows a 9.2 cm uterus that is anteverted.  Her endometrial lining 0.72 cm.  There is a simple right ovarian cyst that measures 2.7 x 2.2 x 2.2 cm.  This ultrasound was compared her last scan on 10/21/2020.  We had a long discussion about her vaginal bleeding.  This has been a longstanding problem.  We discussed treatment options including D&C, OCPs, progesterone only pills, IUD, ablation (I have reservations) as well as hysterectomy.  At this point, she declines any treatment.  We did discuss the use of oral progesterone if these vaginal bleeding episodes recur and she is agreeable but wants to hold off on any treatment at present.      The following portions of the patient's history were reviewed and updated as appropriate: allergies, current medications and problem list.    Review of Systems   Constitutional: Positive for activity change.   Gastrointestinal: Negative for abdominal pain.   Genitourinary: Positive for menstrual problem and vaginal bleeding.   Musculoskeletal: Positive for back pain.   All other systems reviewed and are negative.      BP 98/62   Ht 154.9 cm (61\")   Wt 44.9 kg (99 lb)   LMP 09/27/2020   Breastfeeding No   BMI 18.71 kg/m²     Physical Exam  Vitals signs and nursing note reviewed. Exam conducted with a chaperone present.   Constitutional:       Appearance: Normal appearance. She is well-developed and normal weight.   HENT:      Head: Normocephalic " and atraumatic.   Eyes:      General: No scleral icterus.     Conjunctiva/sclera: Conjunctivae normal.   Neck:      Musculoskeletal: Neck supple.      Thyroid: No thyromegaly.   Abdominal:      General: Bowel sounds are normal. There is no distension.      Palpations: Abdomen is soft. There is no mass.      Tenderness: There is no abdominal tenderness. There is no guarding or rebound.      Hernia: No hernia is present.   Genitourinary:     General: Normal vulva.      Vagina: Bleeding present.      Cervix: Normal.      Uterus: Enlarged and tender.       Adnexa: Left adnexa normal.   Skin:     General: Skin is warm and dry.   Neurological:      Mental Status: She is alert and oriented to person, place, and time.   Psychiatric:         Mood and Affect: Mood normal.         Behavior: Behavior normal.         Thought Content: Thought content normal.         Judgment: Judgment normal.     PROCEDURE NOTE    Procedures      Diagnosis  Menometrorrhagia       Patient's last menstrual period was 09/27/2020.         UCG: negative  Menopausal No   HRT  negative   Current contraception: condoms    Applying Universal Precautions I properly identified the patient and sought her signature with informed consent.  The reason for the biopsy was discussed.  Using a betadine prep on the cervix the cervix was grasped with a tenaculum and the uterus sounded to 7 cm.  A disposable Pipelle was used to retrieve the specimen by passing it 5 times into the cavity hoping to sample more than one area.     Additional procedures necessary were not necessary  The specimen was labelled and placed in a formalin container.  Tissue was adequate  The patient tolerated the procedure    Instructions were given on vaginal rest, OTC tylenol, Motrin or Aleve, and expected future bleeding.  Resulting and follow up were discussed.          A/P:  1. Menometrorrhagia- check CBC. Records review shows a recent normal TSH in 12/2020. S/p endo bx. Pt states bleeding  has ceased and she is now only spotting. We discussed treatment options and she will call back for progesterone course if VB recurs. She declines any definitive management at present. Exam d/w adenomyosis.  2. RHM- UTD annual 10/2020  3. MMG UTD 12/2020- B2  4. R ovarian cyst- simple and non painful. This is a size that does not need to be followed if she is asymptomatic.     Assessment/Plan   Diagnoses and all orders for this visit:    1. Menometrorrhagia (Primary)  -     CBC & Differential  -     Reference Histopathology    2. Screening for condition  -     POC Urinalysis Dipstick    3. Cyst of right ovary                 No follow-ups on file.      Lori Benito MD    1/20/2021  13:05 EST

## 2021-01-22 LAB
DX ICD CODE: NORMAL
PATH REPORT.FINAL DX SPEC: NORMAL
PATH REPORT.GROSS SPEC: NORMAL
PATH REPORT.SITE OF ORIGIN SPEC: NORMAL
PATHOLOGIST NAME: NORMAL
PAYMENT PROCEDURE: NORMAL

## 2021-03-30 ENCOUNTER — TELEPHONE (OUTPATIENT)
Dept: OBSTETRICS AND GYNECOLOGY | Facility: CLINIC | Age: 51
End: 2021-03-30

## 2021-03-30 DIAGNOSIS — Z13.9 SCREENING FOR CONDITION: Primary | ICD-10-CM

## 2021-03-30 NOTE — TELEPHONE ENCOUNTER
Patient calling states she would like a DEXA scan done. She had a MRI done and was told she had bone tejeda edema. Is this something you can do for her? Patient stated she thought there were 2 types of scans and she would like the full body scan?

## 2021-03-30 NOTE — TELEPHONE ENCOUNTER
"I ordered a DEXA scan for her. There is no \"full body scan\" for BMD, but this test does look at the lumbar spine and the hip ( whereas some DEXAs just look at the heel or forearm, so I think we are both talking about the same thing)   "

## 2021-04-07 ENCOUNTER — APPOINTMENT (OUTPATIENT)
Dept: BONE DENSITY | Facility: HOSPITAL | Age: 51
End: 2021-04-07

## 2021-04-07 DIAGNOSIS — Z13.9 SCREENING FOR CONDITION: ICD-10-CM

## 2021-04-07 PROCEDURE — 77080 DXA BONE DENSITY AXIAL: CPT

## 2021-04-08 NOTE — PROGRESS NOTES
PIP= DEXA shows osteopenia with a low risk of fracture. Rec daily calcium and vit D and repeat DEXA in 2 years

## 2021-04-27 ENCOUNTER — TRANSCRIBE ORDERS (OUTPATIENT)
Dept: ADMINISTRATIVE | Facility: HOSPITAL | Age: 51
End: 2021-04-27

## 2021-04-27 DIAGNOSIS — U07.1 COVID-19: Primary | ICD-10-CM

## 2021-04-29 ENCOUNTER — TRANSCRIBE ORDERS (OUTPATIENT)
Dept: ADMINISTRATIVE | Facility: HOSPITAL | Age: 51
End: 2021-04-29

## 2021-04-29 DIAGNOSIS — U07.1 COVID-19: Primary | ICD-10-CM

## 2021-05-10 ENCOUNTER — TELEPHONE (OUTPATIENT)
Dept: GASTROENTEROLOGY | Facility: CLINIC | Age: 51
End: 2021-05-10

## 2021-05-14 ENCOUNTER — APPOINTMENT (OUTPATIENT)
Dept: LAB | Facility: HOSPITAL | Age: 51
End: 2021-05-14

## 2021-05-14 ENCOUNTER — TELEPHONE (OUTPATIENT)
Dept: INTERNAL MEDICINE | Facility: CLINIC | Age: 51
End: 2021-05-14

## 2021-05-14 NOTE — TELEPHONE ENCOUNTER
PATIENT STATES THAT SHE HAD HER FIRST PFIZER VACCINE ON April 9TH.  SHE STATES THAT SHE EXPERIENCED BLURRED VISION AND ELEVATED HEART RATE.  SHE STATES THAT SHE TALKED TO A [place] NURSE AND WAS ADVISED TO CONTACT HER PCP    PATIENT IS SCHEDULED TO HAVE THE SECOND VACCINE ON WED, PLEASE ADVISE    PATIENT CAN BE REACHED AT  [phone]

## 2021-05-17 NOTE — TELEPHONE ENCOUNTER
I spoke with patient.  She had brief blurry vision after the vaccine and racing heart for a few minutes later that night that may have been related to anxiety.  No symptoms of a severe reaction.  She will get her 2nd vaccine at a [place] vaccine site and will wait 30 minutes on sit after the vaccine.

## 2021-07-12 ENCOUNTER — OFFICE VISIT (OUTPATIENT)
Dept: ORTHOPEDIC SURGERY | Facility: CLINIC | Age: 51
End: 2021-07-12

## 2021-07-12 VITALS — HEIGHT: 61 IN | TEMPERATURE: 96.8 F | WEIGHT: 100 LBS | BODY MASS INDEX: 18.88 KG/M2

## 2021-07-12 DIAGNOSIS — R52 PAIN: Primary | ICD-10-CM

## 2021-07-12 DIAGNOSIS — M62.561 ATROPHY OF MUSCLE OF RIGHT LOWER LEG: ICD-10-CM

## 2021-07-12 PROCEDURE — 73562 X-RAY EXAM OF KNEE 3: CPT | Performed by: ORTHOPAEDIC SURGERY

## 2021-07-12 PROCEDURE — 99203 OFFICE O/P NEW LOW 30 MIN: CPT | Performed by: ORTHOPAEDIC SURGERY

## 2021-07-12 RX ORDER — PROGESTERONE 100 MG/1
100 CAPSULE ORAL
COMMUNITY
Start: 2021-06-23 | End: 2021-12-16 | Stop reason: SDUPTHER

## 2021-07-12 NOTE — PROGRESS NOTES
Patient: Purnima Altamirano    YOB: 1970    Medical Record Number: [id number]    Chief Complaints: Right leg pain and atrophy    History of Present Illness:     51 y.o. female patient who presents for evaluation of her right leg.  She tells me she had a meniscus tear in the right knee which was diagnosed in [facility].  She actually had bilateral meniscus tears and had a scope for the left but did not have any surgery for the right.  The right knee actually did fine although she would occasionally get injections in the left knee.  In December 2020 she was having some right knee pain and got the right knee injected.  She tells me that her orthopedist performed 2 injections, 1 into the joint and one just below the joint.  After this injection, she developed atrophy of the tissues around the knee.  The atrophy has spread up into her thigh.  She admits that she is typically very active but has not been nearly as active lately due to her knee problems.  She reports mild to moderate associated pain which is both aching and dull.  The symptoms are worse when working out, sitting and at times with walking.  Most of her pain is over the lateral aspect of the knee in the area of the atrophy.  Denies any mechanical symptoms.  Denies any numbness or tingling.  Denies any weakness.  She very much enjoys working out including lunges and squats.    Allergies:   Allergies   Allergen Reactions   • Cefdinir        Home Medications:      Current Outpatient Medications:   •  amitriptyline (ELAVIL) 10 MG tablet, TAKE 5 TABLETS BY MOUTH EVERY NIGHT AT BEDTIME., Disp: , Rfl:   •  Progesterone (PROMETRIUM) 100 MG capsule, Take 100 mg by mouth every night at bedtime., Disp: , Rfl:   •  ibuprofen (ADVIL,MOTRIN) 600 MG tablet, Take 1 tablet by mouth Every 8 (Eight) Hours As Needed for Mild Pain ., Disp: 30 tablet, Rfl: 0    Past Medical History:   Diagnosis Date   • Abnormal Pap smear of cervix    • Adenomyosis    • Cervical  dysplasia     Status post laser with normal follow-ups   • IBS (irritable bowel syndrome)    • PMS (premenstrual syndrome)        Past Surgical History:   Procedure Laterality Date   • AUGMENTATION MAMMAPLASTY Bilateral    • CERVIX LESION DESTRUCTION     • COLONOSCOPY      4 or 5 years ago Dr. Russell    • KNEE SURGERY Left    • MAMMO BILATERAL  01/2016       Social History     Occupational History   • Not on file   Tobacco Use   • Smoking status: Never Smoker   • Smokeless tobacco: Never Used   Substance and Sexual Activity   • Alcohol use: No   • Drug use: No   • Sexual activity: Yes     Partners: Male     Birth control/protection: Condom      Social History     Social History Narrative   • Not on file       Family History   Problem Relation Age of Onset   • Stroke Maternal Grandmother    • Heart attack Mother    • Heart attack Sister    • Breast cancer Neg Hx    • Ovarian cancer Neg Hx    • Colon cancer Neg Hx    • Deep vein thrombosis Neg Hx    • Pulmonary embolism Neg Hx    • Colon polyps Neg Hx        Review of Systems:      Constitutional: Denies fever, shaking or chills   Eyes: Denies change in visual acuity   HEENT: Denies nasal congestion or sore throat   Respiratory: Denies cough or shortness of breath   Cardiovascular: Denies chest pain or edema  Endocrine: Denies tremors, palpitations, intolerance of heat or cold, polyuria, polydipsia.  GI: Denies abdominal pain, nausea, vomiting, bloody stools or diarrhea  : Denies frequency, urgency, incontinence, retention, or nocturia.  Musculoskeletal: Denies numbness, tingling or loss of motor function except as above  Integument: Denies rash, lesion or ulceration   Neurologic: Denies headache or focal weakness, deficits  Heme: Denies spontaneous or excessive bleeding, epistaxis, hematuria, melena, fatigue, enlarged or tender lymph nodes.      All other pertinent positives and negatives as noted above in HPI.    Physical Exam: 51 y.o. female    Vitals:     "07/12/21 0933   Temp: 96.8 °F (36 °C)   Weight: 45.4 kg (100 lb)   Height: 154.9 cm (61\")       General:  Patient is awake and alert.  Appears in no acute distress or discomfort.    Psych:  Affect and demeanor are appropriate.    Eyes:  Conjunctiva and sclera appear grossly normal.  Eyes track well and EOM seem to be intact.    Ears:  No gross abnormalities.  Hearing adequate for the exam.    Cardiovascular:  Regular rate and rhythm.    Lungs:  Good chest expansion.  Breathing unlabored.    Lymph:  No palpable masses or adenopathy in the affected extremity    Extremities: Her right leg is examined.  She has atrophy of the anterolateral soft tissues over the proximal tibia.  She also has some atrophy of her VMO compared to the contralateral side.  No swellings or masses.  She is mildly tender over the lateral aspect of the knee.  No knee effusion.  No significant joint line tenderness.  Full knee motion.  Negative medial and lateral Marni's test.  No instability.  She has good strength with resisted knee extension, ankle and great toe plantarflexion and dorsiflexion as well as ankle inversion and eversion.  Intact sensation throughout the leg and foot.  No clonus.  Palpable pedal pulses.         Radiology:   AP, merchant and lateral views of the right knee are ordered and reviewed evaluate her complaint.  This includes AP and merchant's views of both knees.  The x-rays show mild patellofemoral arthritis on the left.  No other significant findings noted.    MRI of the right knee is reviewed.  I do not have the radiology report.  It appears that she has patellofemoral arthritis with microtrabecular fractures and subchondral marrow edema in the patella.  The tibiofemoral articulation looks okay.  Menisci look okay.  I do not see any significant abnormalities in the area of the atrophy noted on exam.  I will try to track down the report for the study to compare.    Assessment/Plan: Right knee muscular atrophy status " post steroid injection, patellofemoral osteoarthritis    She has patellofemoral arthritis but it does not seem to be much of a symptomatic issue for her.  I advised her that she may want to avoid squats and lunges to minimize her symptoms in the future.      I think the atrophy is due to the steroid injection.  The atrophy of her VMO I think is just due to her relative inactivity.  I explained that there is a much less likely possibility that this represents a neuromuscular condition.  I think the treatment for her is going to likely be physical therapy.  She is very much concerned about the possibility of a neuromuscular condition given the progressive atrophy of her thigh.  I agreed to refer her for nerve studies.  I told her I will call her with the results.    Clay Vanessa MD    07/12/2021    CC to Trace Min MD

## 2021-08-18 DIAGNOSIS — G43.909 MIGRAINE WITHOUT STATUS MIGRAINOSUS, NOT INTRACTABLE, UNSPECIFIED MIGRAINE TYPE: Primary | ICD-10-CM

## 2021-08-18 RX ORDER — SUMATRIPTAN 100 MG/1
100 TABLET, FILM COATED ORAL ONCE AS NEEDED
Qty: 9 TABLET | Refills: 11 | OUTPATIENT
Start: 2021-08-18

## 2021-08-18 RX ORDER — SUMATRIPTAN 100 MG/1
100 TABLET, FILM COATED ORAL DAILY PRN
Qty: 30 TABLET | Refills: 11 | Status: SHIPPED | OUTPATIENT
Start: 2021-08-18 | End: 2021-10-13 | Stop reason: SDUPTHER

## 2021-08-18 RX ORDER — SUMATRIPTAN 100 MG/1
100 TABLET, FILM COATED ORAL DAILY PRN
COMMUNITY
Start: 2021-07-16 | End: 2021-08-18 | Stop reason: SDUPTHER

## 2021-08-18 NOTE — TELEPHONE ENCOUNTER
Caller: Purnima Altamirano    Relationship: Self    Best call back number: [phone]    Medication needed:   Requested Prescriptions      No prescriptions requested or ordered in this encounter   SUMATRIPTAN 100MG    When do you need the refill by:     What additional details did the patient provide when requesting the medication: PATIENT STATES THAT SHE GOT A TEXT MESSAGE FROM HER PHARMACY STATING THAT THE REFILL REQUEST WAS NOT AUTHORIZED BY HER PROVIDER.     Does the patient have less than a 3 day supply:  [x] Yes  [] No    What is the patient's preferred pharmacy:    [facility]  [address] [place] [address]  [phone]

## 2021-09-30 RX ORDER — AMITRIPTYLINE HYDROCHLORIDE 10 MG/1
TABLET, FILM COATED ORAL
OUTPATIENT
Start: 2021-09-30

## 2021-10-01 ENCOUNTER — TELEPHONE (OUTPATIENT)
Dept: ORTHOPEDIC SURGERY | Facility: CLINIC | Age: 51
End: 2021-10-01

## 2021-10-01 NOTE — TELEPHONE ENCOUNTER
That order needs to come from her referring physician. I did not refer her to the neurosurgeon. I referred her to a neurologist for nerve testing. I still do not see that has been performed yet. You may want to follow-up with her on that.

## 2021-10-01 NOTE — TELEPHONE ENCOUNTER
Caller: DON DENNIS    Relationship: SELF    Best call back number: [phone]    What is the medical concern/diagnosis: LOW BACK PAIN    What specialty or service is being requested: NEUROSURGERY -- NERVE TEST SHOWED TWO PINCHED NERVES    What is the provider, practice or medical service name: DR PETIT AT [facility]    What is the office location: [facility]    What is the office phone number: [phone]; .[phone]    Any additional details: PT SAID THAT DR PETIT'S OFFICE TOLD HER SHE WOULD NEED AN MRI BEFORE BEING SEEN

## 2021-10-04 RX ORDER — AMITRIPTYLINE HYDROCHLORIDE 10 MG/1
50 TABLET, FILM COATED ORAL NIGHTLY
Qty: 150 TABLET | Refills: 0 | Status: SHIPPED | OUTPATIENT
Start: 2021-10-04 | End: 2021-10-13 | Stop reason: SDUPTHER

## 2021-10-04 NOTE — TELEPHONE ENCOUNTER
Called patient back and she said she did have her nerve test done at [facility], she is going to fax over the results so Dr. Vanessa can go over them with her.

## 2021-10-08 ENCOUNTER — APPOINTMENT (OUTPATIENT)
Dept: INFUSION THERAPY | Facility: HOSPITAL | Age: 51
End: 2021-10-08

## 2021-10-13 ENCOUNTER — OFFICE VISIT (OUTPATIENT)
Dept: INTERNAL MEDICINE | Facility: CLINIC | Age: 51
End: 2021-10-13

## 2021-10-13 VITALS
HEART RATE: 75 BPM | DIASTOLIC BLOOD PRESSURE: 76 MMHG | SYSTOLIC BLOOD PRESSURE: 104 MMHG | WEIGHT: 100.3 LBS | BODY MASS INDEX: 18.94 KG/M2 | HEIGHT: 61 IN | TEMPERATURE: 98.7 F | RESPIRATION RATE: 20 BRPM | OXYGEN SATURATION: 97 %

## 2021-10-13 DIAGNOSIS — M85.89 OSTEOPENIA OF MULTIPLE SITES: ICD-10-CM

## 2021-10-13 DIAGNOSIS — Z85.828 HISTORY OF BASAL CELL CARCINOMA OF SKIN: ICD-10-CM

## 2021-10-13 DIAGNOSIS — J45.20 MILD INTERMITTENT ASTHMA WITHOUT COMPLICATION: ICD-10-CM

## 2021-10-13 DIAGNOSIS — G47.00 INSOMNIA, UNSPECIFIED TYPE: ICD-10-CM

## 2021-10-13 DIAGNOSIS — K58.1 IRRITABLE BOWEL SYNDROME WITH CONSTIPATION: ICD-10-CM

## 2021-10-13 DIAGNOSIS — G43.909 MIGRAINE WITHOUT STATUS MIGRAINOSUS, NOT INTRACTABLE, UNSPECIFIED MIGRAINE TYPE: Primary | ICD-10-CM

## 2021-10-13 DIAGNOSIS — Z00.00 ROUTINE HEALTH MAINTENANCE: ICD-10-CM

## 2021-10-13 DIAGNOSIS — N92.0 MENORRHAGIA WITH REGULAR CYCLE: ICD-10-CM

## 2021-10-13 PROBLEM — M85.80 OSTEOPENIA: Status: ACTIVE | Noted: 2021-10-13

## 2021-10-13 PROCEDURE — 99214 OFFICE O/P EST MOD 30 MIN: CPT | Performed by: FAMILY MEDICINE

## 2021-10-13 RX ORDER — AMITRIPTYLINE HYDROCHLORIDE 10 MG/1
50 TABLET, FILM COATED ORAL NIGHTLY
Qty: 150 TABLET | Refills: 11 | Status: SHIPPED | OUTPATIENT
Start: 2021-10-13 | End: 2021-12-01 | Stop reason: SDUPTHER

## 2021-10-13 RX ORDER — SUMATRIPTAN 100 MG/1
100 TABLET, FILM COATED ORAL DAILY PRN
Qty: 30 TABLET | Refills: 11 | Status: SHIPPED | OUTPATIENT
Start: 2021-10-13 | End: 2022-07-25 | Stop reason: SDUPTHER

## 2021-10-13 NOTE — PROGRESS NOTES
Subjective     Purnima Altamirano is a 51 y.o. female, who presents with a chief complaint of   Chief Complaint   Patient presents with   • Migraine     follow up   • Insomnia       Fatigue  Associated symptoms include fatigue and headaches.   Insomnia  Associated symptoms include fatigue and headaches.   Headache   Associated symptoms include insomnia.   Migraine   Associated symptoms include insomnia.      1. Migraines.  She takes amitriptyline for preventive treatment and sumatriptan for abortive treatment.  She believes these are hormonal.    2. Heavy menses.  She sees Dr. Benito.  She takes cyclical progesterone.    3. Insomnia.  Amitriptyline helps.    The following portions of the patient's history were reviewed and updated as appropriate: allergies, current medications, past family history, past medical history, past social history, past surgical history and problem list.    Allergies: Cefdinir    Review of Systems   Constitutional: Positive for fatigue.   Eyes: Negative.    Respiratory: Negative.    Cardiovascular: Negative.    Gastrointestinal: Negative.    Endocrine: Negative.    Genitourinary: Positive for menstrual problem (heavy menses).   Musculoskeletal: Negative.    Skin: Negative.    Allergic/Immunologic: Negative.    Neurological: Positive for headaches.   Hematological: Negative.    Psychiatric/Behavioral: Positive for sleep disturbance. The patient has insomnia.        Objective     Wt Readings from Last 3 Encounters:   10/13/21 45.5 kg (100 lb 4.8 oz)   07/12/21 45.4 kg (100 lb)   01/20/21 44.9 kg (99 lb)     Temp Readings from Last 3 Encounters:   10/13/21 98.7 °F (37.1 °C) (Temporal)   07/12/21 96.8 °F (36 °C)   10/07/20 97.8 °F (36.6 °C) (Temporal)     BP Readings from Last 3 Encounters:   10/13/21 104/76   01/20/21 98/62   10/21/20 110/62     Pulse Readings from Last 3 Encounters:   10/13/21 75   09/28/20 77   01/25/19 83     Body mass index is 18.96 kg/m².    Physical Exam    Constitutional: She is oriented to person, place, and time. She appears well-developed.   HENT:   Head: Normocephalic and atraumatic.   Eyes: Conjunctivae are normal.   Neck: No thyromegaly present.   Cardiovascular: Normal rate, regular rhythm and normal heart sounds.   Pulmonary/Chest: Effort normal and breath sounds normal.   Abdominal: Soft. Bowel sounds are normal.   Musculoskeletal: Normal range of motion.   Lymphadenopathy:     She has no cervical adenopathy.   Neurological: She is alert and oriented to person, place, and time.   Skin: Skin is warm and dry. No rash noted.   Psychiatric: Her behavior is normal.   Nursing note and vitals reviewed.      Assessment/Plan   Diagnoses and all orders for this visit:    1. Migraine without status migrainosus, not intractable, unspecified migraine type (Primary)  -     amitriptyline (ELAVIL) 10 MG tablet; Take 5 tablets by mouth Every Night.  Dispense: 150 tablet; Refill: 11  -     SUMAtriptan (IMITREX) 100 MG tablet; Take 1 tablet by mouth Daily As Needed for Migraine.  Dispense: 30 tablet; Refill: 11    2. History of basal cell carcinoma of skin    3. Insomnia, unspecified type  -     amitriptyline (ELAVIL) 10 MG tablet; Take 5 tablets by mouth Every Night.  Dispense: 150 tablet; Refill: 11    4. Mild intermittent asthma without complication    5. Menorrhagia with regular cycle  -     CBC & Differential  -     TSH  -     Ferritin    6. Irritable bowel syndrome with constipation    7. Osteopenia of multiple sites    8. Routine health maintenance  -     Comprehensive Metabolic Panel  -     Lipid Panel With / Chol / HDL Ratio  -     Hemoglobin A1c  -     Vitamin B12  -     Vitamin D 25 Hydroxy      1. Migraines. Continue prn sumatriptan.    2. History of basal cell carcinoma.  Followed by Dr. Green.    3. Insomnia.  Controlled with amitriptyline to 50 mg nightly.  Lifestyle measures.    4. Asthma.  No flare.     5. Menorrhagia.  Labs today.  She is followed by   [facility].    6. IBS with constipation.  Pt adopted a gluten free diet several months ago and reports she is feeling much better.    7. Osteopenia.  On calcium and vitamin D.  Weight bearing exercise.  Per Dr. Benito.  Dexa scan 4/2021.    8. Routine health maint.  She is considering flu vaccine.  Covid-19 vaccines done.  Mammogram due in December; per Dr. Beniot.    9. History of positive Cologuard.  She had to cancel her colonoscopy.  She will reschedule this with Dr. Ulloa.        Current Outpatient Medications:   •  amitriptyline (ELAVIL) 10 MG tablet, Take 5 tablets by mouth Every Night., Disp: 150 tablet, Rfl: 11  •  Calcium-Magnesium 500-250 MG tablet, Take  by mouth., Disp: , Rfl:   •  ibuprofen (ADVIL,MOTRIN) 600 MG tablet, Take 1 tablet by mouth Every 8 (Eight) Hours As Needed for Mild Pain ., Disp: 30 tablet, Rfl: 0  •  Progesterone (PROMETRIUM) 100 MG capsule, Take 100 mg by mouth every night at bedtime., Disp: , Rfl:   •  SUMAtriptan (IMITREX) 100 MG tablet, Take 1 tablet by mouth Daily As Needed for Migraine., Disp: 30 tablet, Rfl: 11  Medications Discontinued During This Encounter   Medication Reason   • SUMAtriptan (IMITREX) 100 MG tablet Reorder   • amitriptyline (ELAVIL) 10 MG tablet Reorder       Return in about 1 year (around 10/13/2022).

## 2021-10-14 LAB
25(OH)D3+25(OH)D2 SERPL-MCNC: 94 NG/ML (ref 30–100)
ALBUMIN SERPL-MCNC: 4.7 G/DL (ref 3.5–5.2)
ALBUMIN/GLOB SERPL: 3.4 G/DL
ALP SERPL-CCNC: 34 U/L (ref 39–117)
ALT SERPL-CCNC: 17 U/L (ref 1–33)
AST SERPL-CCNC: 18 U/L (ref 1–32)
BASOPHILS # BLD AUTO: 0.02 10*3/MM3 (ref 0–0.2)
BASOPHILS NFR BLD AUTO: 0.6 % (ref 0–1.5)
BILIRUB SERPL-MCNC: 0.3 MG/DL (ref 0–1.2)
BUN SERPL-MCNC: 11 MG/DL (ref 6–20)
BUN/CREAT SERPL: 16.4 (ref 7–25)
CALCIUM SERPL-MCNC: 9.1 MG/DL (ref 8.6–10.5)
CHLORIDE SERPL-SCNC: 105 MMOL/L (ref 98–107)
CHOLEST SERPL-MCNC: 202 MG/DL (ref 0–200)
CHOLEST/HDLC SERPL: 2.27 {RATIO}
CO2 SERPL-SCNC: 26.1 MMOL/L (ref 22–29)
CREAT SERPL-MCNC: 0.67 MG/DL (ref 0.57–1)
EOSINOPHIL # BLD AUTO: 0.03 10*3/MM3 (ref 0–0.4)
EOSINOPHIL NFR BLD AUTO: 0.9 % (ref 0.3–6.2)
ERYTHROCYTE [DISTWIDTH] IN BLOOD BY AUTOMATED COUNT: 11.8 % (ref 12.3–15.4)
FERRITIN SERPL-MCNC: 35.6 NG/ML (ref 13–150)
GLOBULIN SER CALC-MCNC: 1.4 GM/DL
GLUCOSE SERPL-MCNC: 78 MG/DL (ref 65–99)
HBA1C MFR BLD: 4.7 % (ref 4.8–5.6)
HCT VFR BLD AUTO: 41.9 % (ref 34–46.6)
HDLC SERPL-MCNC: 89 MG/DL (ref 40–60)
HGB BLD-MCNC: 14 G/DL (ref 12–15.9)
IMM GRANULOCYTES # BLD AUTO: 0 10*3/MM3 (ref 0–0.05)
IMM GRANULOCYTES NFR BLD AUTO: 0 % (ref 0–0.5)
LDLC SERPL CALC-MCNC: 106 MG/DL (ref 0–100)
LYMPHOCYTES # BLD AUTO: 0.82 10*3/MM3 (ref 0.7–3.1)
LYMPHOCYTES NFR BLD AUTO: 24.8 % (ref 19.6–45.3)
MCH RBC QN AUTO: 33.1 PG (ref 26.6–33)
MCHC RBC AUTO-ENTMCNC: 33.4 G/DL (ref 31.5–35.7)
MCV RBC AUTO: 99.1 FL (ref 79–97)
MONOCYTES # BLD AUTO: 0.26 10*3/MM3 (ref 0.1–0.9)
MONOCYTES NFR BLD AUTO: 7.9 % (ref 5–12)
NEUTROPHILS # BLD AUTO: 2.18 10*3/MM3 (ref 1.7–7)
NEUTROPHILS NFR BLD AUTO: 65.8 % (ref 42.7–76)
NRBC BLD AUTO-RTO: 0 /100 WBC (ref 0–0.2)
PLATELET # BLD AUTO: 174 10*3/MM3 (ref 140–450)
POTASSIUM SERPL-SCNC: 4.1 MMOL/L (ref 3.5–5.2)
PROT SERPL-MCNC: 6.1 G/DL (ref 6–8.5)
RBC # BLD AUTO: 4.23 10*6/MM3 (ref 3.77–5.28)
SODIUM SERPL-SCNC: 138 MMOL/L (ref 136–145)
TRIGL SERPL-MCNC: 34 MG/DL (ref 0–150)
TSH SERPL DL<=0.005 MIU/L-ACNC: 1.24 UIU/ML (ref 0.27–4.2)
VIT B12 SERPL-MCNC: 1742 PG/ML (ref 211–946)
VLDLC SERPL CALC-MCNC: 7 MG/DL (ref 5–40)
WBC # BLD AUTO: 3.31 10*3/MM3 (ref 3.4–10.8)

## 2021-11-22 ENCOUNTER — TRANSCRIBE ORDERS (OUTPATIENT)
Dept: ADMINISTRATIVE | Facility: HOSPITAL | Age: 51
End: 2021-11-22

## 2021-11-22 DIAGNOSIS — Z12.31 SCREENING MAMMOGRAM, ENCOUNTER FOR: Primary | ICD-10-CM

## 2021-11-23 ENCOUNTER — TELEPHONE (OUTPATIENT)
Dept: OBSTETRICS AND GYNECOLOGY | Facility: CLINIC | Age: 51
End: 2021-11-23

## 2021-11-30 ENCOUNTER — TELEPHONE (OUTPATIENT)
Dept: INTERNAL MEDICINE | Facility: CLINIC | Age: 51
End: 2021-11-30

## 2021-11-30 NOTE — TELEPHONE ENCOUNTER
Caller: Purnima Altamirano    Relationship: Self    Best call back number: [phone]    What was the call regarding: PATIENT NEEDS A PRIOR AUTH FOR amitriptyline (ELAVIL) 10 MG tablet. INSURANCE IS STATING THEY DON'T WANT TO PAY FOR THE DOSAGE. IF THEY WILL PAY FOR THE ONE 50 MG TABLET, PATIENT STATES SHE IS FINE DOING THAT TOO BUT WOULD PREFER THE FIVE TABLETS. PLEASE ADVISE.     Do you require a callback: IF ANY QUESTIONS.

## 2021-12-01 ENCOUNTER — TELEPHONE (OUTPATIENT)
Dept: INTERNAL MEDICINE | Facility: CLINIC | Age: 51
End: 2021-12-01

## 2021-12-01 DIAGNOSIS — G47.00 INSOMNIA, UNSPECIFIED TYPE: ICD-10-CM

## 2021-12-01 DIAGNOSIS — G43.909 MIGRAINE WITHOUT STATUS MIGRAINOSUS, NOT INTRACTABLE, UNSPECIFIED MIGRAINE TYPE: ICD-10-CM

## 2021-12-01 RX ORDER — AMITRIPTYLINE HYDROCHLORIDE 10 MG/1
50 TABLET, FILM COATED ORAL NIGHTLY
Qty: 150 TABLET | Refills: 11 | Status: SHIPPED | OUTPATIENT
Start: 2021-12-01 | End: 2021-12-02 | Stop reason: SDUPTHER

## 2021-12-01 NOTE — TELEPHONE ENCOUNTER
Called and discussed with pt, pt is going to think about which option she would like to do and give me a call back

## 2021-12-01 NOTE — TELEPHONE ENCOUNTER
Pt called back and she stated that she is wanting to try the 50mg 1 tablet daily to see if that will get approved.

## 2021-12-02 DIAGNOSIS — G47.00 INSOMNIA, UNSPECIFIED TYPE: ICD-10-CM

## 2021-12-02 DIAGNOSIS — G43.909 MIGRAINE WITHOUT STATUS MIGRAINOSUS, NOT INTRACTABLE, UNSPECIFIED MIGRAINE TYPE: ICD-10-CM

## 2021-12-02 RX ORDER — AMITRIPTYLINE HYDROCHLORIDE 50 MG/1
50 TABLET, FILM COATED ORAL NIGHTLY
Qty: 90 TABLET | Refills: 3 | Status: SHIPPED | OUTPATIENT
Start: 2021-12-02 | End: 2021-12-03 | Stop reason: SDUPTHER

## 2021-12-03 DIAGNOSIS — G47.00 INSOMNIA, UNSPECIFIED TYPE: ICD-10-CM

## 2021-12-03 DIAGNOSIS — G43.909 MIGRAINE WITHOUT STATUS MIGRAINOSUS, NOT INTRACTABLE, UNSPECIFIED MIGRAINE TYPE: ICD-10-CM

## 2021-12-03 NOTE — TELEPHONE ENCOUNTER
Rx Refill Note  Requested Prescriptions     Pending Prescriptions Disp Refills   • amitriptyline (ELAVIL) 50 MG tablet 90 tablet 3     Sig: Take 1 tablet by mouth Every Night.      Last office visit with prescribing clinician: 10/13/2021      Next office visit with prescribing clinician: Visit date not found            Alexia Matthews MA  12/03/21, 16:16 EST

## 2021-12-06 RX ORDER — AMITRIPTYLINE HYDROCHLORIDE 50 MG/1
50 TABLET, FILM COATED ORAL NIGHTLY
Qty: 90 TABLET | Refills: 3 | Status: SHIPPED | OUTPATIENT
Start: 2021-12-06 | End: 2022-07-25 | Stop reason: SDUPTHER

## 2021-12-16 ENCOUNTER — OFFICE VISIT (OUTPATIENT)
Dept: OBSTETRICS AND GYNECOLOGY | Facility: CLINIC | Age: 51
End: 2021-12-16

## 2021-12-16 VITALS
DIASTOLIC BLOOD PRESSURE: 74 MMHG | HEIGHT: 61 IN | BODY MASS INDEX: 19.07 KG/M2 | WEIGHT: 101 LBS | SYSTOLIC BLOOD PRESSURE: 102 MMHG

## 2021-12-16 DIAGNOSIS — R14.0 ABDOMINAL BLOATING: Primary | ICD-10-CM

## 2021-12-16 DIAGNOSIS — N95.1 MENOPAUSAL SYMPTOMS: ICD-10-CM

## 2021-12-16 DIAGNOSIS — Z79.890 HORMONE REPLACEMENT THERAPY (HRT): ICD-10-CM

## 2021-12-16 PROCEDURE — 99215 OFFICE O/P EST HI 40 MIN: CPT | Performed by: OBSTETRICS & GYNECOLOGY

## 2021-12-16 RX ORDER — GABAPENTIN 100 MG/1
CAPSULE ORAL
COMMUNITY
Start: 2021-10-21 | End: 2022-10-21

## 2021-12-16 RX ORDER — MELOXICAM 15 MG/1
TABLET ORAL
COMMUNITY
Start: 2021-11-20 | End: 2022-02-15

## 2021-12-16 RX ORDER — PROGESTERONE 100 MG/1
100 CAPSULE ORAL
Qty: 90 CAPSULE | Refills: 1 | Status: SHIPPED | OUTPATIENT
Start: 2021-12-16 | End: 2022-04-06 | Stop reason: DRUGHIGH

## 2021-12-17 ENCOUNTER — HOSPITAL ENCOUNTER (OUTPATIENT)
Dept: MAMMOGRAPHY | Facility: HOSPITAL | Age: 51
Discharge: HOME | End: 2021-12-17
Admitting: OBSTETRICS & GYNECOLOGY

## 2021-12-17 DIAGNOSIS — Z12.31 SCREENING MAMMOGRAM, ENCOUNTER FOR: ICD-10-CM

## 2021-12-17 PROCEDURE — 77063 BREAST TOMOSYNTHESIS BI: CPT

## 2021-12-17 PROCEDURE — 77067 SCR MAMMO BI INCL CAD: CPT

## 2021-12-21 ENCOUNTER — TELEPHONE (OUTPATIENT)
Dept: GASTROENTEROLOGY | Facility: CLINIC | Age: 51
End: 2021-12-21

## 2021-12-23 ENCOUNTER — PREP FOR SURGERY (OUTPATIENT)
Dept: SURGERY | Facility: SURGERY CENTER | Age: 51
End: 2021-12-23

## 2021-12-23 DIAGNOSIS — Z12.11 SCREEN FOR COLON CANCER: Primary | ICD-10-CM

## 2022-01-02 NOTE — PROGRESS NOTES
"      Purnima Altamirano is a 51 y.o. patient who presents for follow up of   Chief Complaint   Patient presents with   • Follow-up     discuss HRT     50 yo est pt here for discussion of HRT. She was last seen in 10/2020 and was having regular but heavy cycles at that time. She had severe fatigue and so went to a freestanding hormone \"spa\" and got \"[employer]\". She took an estrogen pellet and is made her very jittery. She is also cycling on Prometrium and using testosterone cream. She is having cycles still and they are lighter overall. She is having bloating, sreedhar at night after eating. She has not had her MMG or Cscope yet. She wants an opinion re: her risks related to HRT. She has had recent normal labs with her primary MD.         The following portions of the patient's history were reviewed and updated as appropriate: allergies, current medications and problem list.    Review of Systems   Constitutional: Positive for activity change and fatigue.   Gastrointestinal: Positive for abdominal distention.   Endocrine: Positive for heat intolerance.   Psychiatric/Behavioral: The patient is nervous/anxious.    All other systems reviewed and are negative.      /74   Ht 154.9 cm (60.98\")   Wt 45.8 kg (101 lb)   LMP 09/27/2020   BMI 19.10 kg/m²     Physical Exam  Vitals and nursing note reviewed.   Constitutional:       Appearance: Normal appearance. She is well-developed and normal weight.   HENT:      Head: Normocephalic and atraumatic.   Eyes:      General: No scleral icterus.     Conjunctiva/sclera: Conjunctivae normal.   Neck:      Thyroid: No thyromegaly.   Skin:     General: Skin is warm and dry.   Neurological:      Mental Status: She is alert and oriented to person, place, and time.   Psychiatric:         Mood and Affect: Mood normal.         Behavior: Behavior normal.         Thought Content: Thought content normal.         Judgment: Judgment normal.         A/P:  1. Menopausal symptoms- Menopause is one " "whole year without a menstrual cycle in the correct age individual.  The \"perimenopause\" refers to hormonal changes and symptoms that may occur in the 5 to 10 years prior to cycles actually stopping.  The symptoms may include hot flashes, night sweats, insomnia or altered quality of sleep, moodiness, irritability, weight gain, hair loss or growth, libido changes as well as changes in the length and severity of menstrual bleeding.  Any vaginal bleeding that last longer than 10 days, any cycles that are closer together than 21 days or any cycles that are very heavy should prompt an appointment for evaluation.  2. Bioidentical HRT- Patient was counseled that evidence is lacking to support superiority claims of compounded bioidentical hormones over conventional menopausal hormone therapy.  Compounded or “customized” hormones pose additional risks above those known for FDA approved therapies, as compounded hormones have variable purity and potency.  This variability and lack of regulation in these therapies make both under dosage and over dosage possible.  Despite claims to the contrary, evidence is inadequate to support increased efficacy or safety for individualized hormone therapy regimen based on salivary, serum or urinary testing.  Any side effects such as vaginal bleeding or pain should be reported immediately.    3. HRT- Patient counseled that hormone treatment should be used to help with specific symptoms related to menopause such as hot flashes, night sweats and vaginal atrophy.  Hormones should not be given for “general wellbeing” or to avoid aging. The lowest dose hormone that treats symptoms should be used for the shortest amount of time possible.  Although estrogen is the most effective treatment for hot flashes, other non hormonal options exist (such as Brisdelle or venlafaxine) and should also be considered.  Anyone with an intact uterus should also receive progestogen to prevent uterine overgrowth that " can lead to uterine cancer.  All hormone therapy, whether it is synthetic or bio identical, can lead to increased risk of stroke, heart attack and thromboembolic diseases.  These adverse events are more likely to develop in the first year of use and in patients who are older than the typical menopausal age.  Risks of estrogen dependent cancers such as breast cancer increase with prolonged use.  Attempts to wean hormone therapy should be discussed annually and particularly after three to five years of use.  Any side effects such as vaginal bleeding or pain should be reported immediately.  Given her history of a healthy sister with an unexpected MI  And her own adverse reaction to it, enc her to forgoe use of estrogen and change to Progesterone 100 mg QHS.   4. Testosterone therapy- we discussed that there is limited data re; the safety and efficacy of testosterone use in women. She is very keen to keep taking it and rx for compunded Testosterone cream was sent in to [facility] pharmacy.  5. Refer C scope Dr Ulloa. If no etiology for bloating found by US, check TVUS. Pt declines TVUS at present.  6. Schedule MMG now  7. DEXA- UTD 4/2021- osteopenia with a low risk of fracture- 6 & 1 %. Enc calcium, Vit D and weight bearing exercises  8. RTO 3/2022 annual and recheck symptoms.  9. Time Spent: I spent > 50 minutes caring for Purnima on this date of service. This time includes time spent by me in the following activities: preparing for the visit, reviewing tests, obtaining and/or reviewing a separately obtained history, performing a medically appropriate examination and/or evaluation, counseling and educating the patient/family/caregiver, ordering medications, tests, or procedures, referring and communicating with other health care professionals, documenting information in the medical record and independently interpreting results and communicating that information with the patient/family/caregiver.      Assessment/Plan   Diagnoses and all orders for this visit:    1. Abdominal bloating (Primary)  -     Ambulatory Referral to Gastroenterology    2. Menopausal symptoms    3. Hormone replacement therapy (HRT)    Other orders  -     Progesterone (PROMETRIUM) 100 MG capsule; Take 1 capsule by mouth every night at bedtime.  Dispense: 90 capsule; Refill: 1                 No follow-ups on file.      Lori Benito MD    12/16/2021  14:55 EST

## 2022-02-15 ENCOUNTER — OFFICE VISIT (OUTPATIENT)
Dept: GASTROENTEROLOGY | Facility: CLINIC | Age: 52
End: 2022-02-15

## 2022-02-15 VITALS
DIASTOLIC BLOOD PRESSURE: 72 MMHG | BODY MASS INDEX: 19.84 KG/M2 | SYSTOLIC BLOOD PRESSURE: 108 MMHG | HEIGHT: 61 IN | WEIGHT: 105.1 LBS

## 2022-02-15 DIAGNOSIS — K58.9 IRRITABLE BOWEL SYNDROME, UNSPECIFIED TYPE: ICD-10-CM

## 2022-02-15 DIAGNOSIS — R19.5 POSITIVE COLORECTAL CANCER SCREENING USING COLOGUARD TEST: Primary | ICD-10-CM

## 2022-02-15 PROCEDURE — 99213 OFFICE O/P EST LOW 20 MIN: CPT | Performed by: INTERNAL MEDICINE

## 2022-02-15 RX ORDER — ALBUTEROL SULFATE 90 UG/1
AEROSOL, METERED RESPIRATORY (INHALATION)
COMMUNITY
Start: 2021-12-27

## 2022-02-15 NOTE — PROGRESS NOTES
PATIENT INFORMATION  Purnima Altamirano       - 1970    CHIEF COMPLAINT  Chief Complaint   Patient presents with   • Bloated   • Abdominal Cramping   • Constipation   • Irritable Bowel Syndrome       HISTORY OF PRESENT ILLNESS  Here from Delayed Colon from last year but also at 52 she is still menstrating and has Gas bloating and cramps worse after meals and also worse in her Pre syndrome.    Only rare skip days and prior to her periods, but overall moves her bowels daily to twice. Overall no real issues  With her BMs outside her menstruation    No significant Dyspepsia           REVIEWED PERTINENT RESULTS/ LABS  No results found for: CASEREPORT, FINALDX  Lab Results   Component Value Date    HGB 14.0 10/13/2021    MCV 99.1 (H) 10/13/2021     10/13/2021    ALT 17 10/13/2021    AST 18 10/13/2021    HGBA1C 4.70 (L) 10/13/2021    TRIG 34 10/13/2021    FERRITIN 35.60 10/13/2021      No results found.    REVIEW OF SYSTEMS  Review of Systems   Constitutional: Negative for activity change, chills, fever and unexpected weight change.   HENT: Negative for congestion.    Eyes: Negative for visual disturbance.   Respiratory: Negative for shortness of breath.    Cardiovascular: Negative for chest pain and palpitations.   Gastrointestinal: Positive for abdominal distention, abdominal pain, constipation and diarrhea. Negative for blood in stool.   Endocrine: Negative for cold intolerance and heat intolerance.   Genitourinary: Negative for hematuria.   Musculoskeletal: Negative for gait problem.   Skin: Negative for color change.   Allergic/Immunologic: Negative for immunocompromised state.   Neurological: Negative for weakness and light-headedness.   Hematological: Negative for adenopathy.   Psychiatric/Behavioral: Negative for sleep disturbance. The patient is not nervous/anxious.          ACTIVE PROBLEMS  Patient Active Problem List    Diagnosis    • Osteopenia [M85.80]    • Encounter for screening for  malignant neoplasm of colon [Z12.11]    • Positive colorectal cancer screening using Cologuard test [R19.5]    • IBS (irritable bowel syndrome) [K58.9]    • Adenomyosis [N80.0]    • History of basal cell carcinoma of skin [Z85.828]    • Mild intermittent asthma [J45.20]    • Menorrhagia with regular cycle [N92.0]    • Fatigue [R53.83]    • Migraines [G43.909]    • Insomnia [G47.00]    • Irritable bowel [K58.9]    • Routine health maintenance [Z00.00]          PAST MEDICAL HISTORY  Past Medical History:   Diagnosis Date   • Abnormal Pap smear of cervix    • Adenomyosis    • Cervical dysplasia     Status post laser with normal follow-ups   • IBS (irritable bowel syndrome)    • PMS (premenstrual syndrome)          SURGICAL HISTORY  Past Surgical History:   Procedure Laterality Date   • AUGMENTATION MAMMAPLASTY Bilateral    • CERVIX LESION DESTRUCTION     • COLONOSCOPY      4 or 5 years ago Dr. Russell    • KNEE SURGERY Left    • MAMMO BILATERAL  01/2016         FAMILY HISTORY  Family History   Problem Relation Age of Onset   • Stroke Maternal Grandmother    • Heart attack Mother    • Heart attack Sister    • Breast cancer Neg Hx    • Ovarian cancer Neg Hx    • Colon cancer Neg Hx    • Deep vein thrombosis Neg Hx    • Pulmonary embolism Neg Hx    • Colon polyps Neg Hx          SOCIAL HISTORY  Social History     Occupational History   • Not on file   Tobacco Use   • Smoking status: Never Smoker   • Smokeless tobacco: Never Used   Vaping Use   • Vaping Use: Never used   Substance and Sexual Activity   • Alcohol use: No   • Drug use: No   • Sexual activity: Yes     Partners: Male     Birth control/protection: Condom         CURRENT MEDICATIONS    Current Outpatient Medications:   •  albuterol sulfate  (90 Base) MCG/ACT inhaler, INHALE 2 PUFFS EVERY 6 HOURS IF NEEDED FOR WHEEZING., Disp: , Rfl:   •  amitriptyline (ELAVIL) 50 MG tablet, Take 1 tablet by mouth Every Night., Disp: 90 tablet, Rfl: 3  •  Calcium-Magnesium  "500-250 MG tablet, Take  by mouth., Disp: , Rfl:   •  gabapentin (NEURONTIN) 100 MG capsule, Take 1-3 tablets by mouth daily at night, Disp: , Rfl:   •  ibuprofen (ADVIL,MOTRIN) 600 MG tablet, Take 1 tablet by mouth Every 8 (Eight) Hours As Needed for Mild Pain ., Disp: 30 tablet, Rfl: 0  •  Progesterone (PROMETRIUM) 100 MG capsule, Take 1 capsule by mouth every night at bedtime., Disp: 90 capsule, Rfl: 1  •  SUMAtriptan (IMITREX) 100 MG tablet, Take 1 tablet by mouth Daily As Needed for Migraine., Disp: 30 tablet, Rfl: 11    ALLERGIES  Cefdinir    VITALS  Vitals:    02/15/22 0816   BP: 108/72   BP Location: Left arm   Patient Position: Sitting   Cuff Size: Adult   Weight: 47.7 kg (105 lb 1.6 oz)   Height: 154.9 cm (61\")       PHYSICAL EXAM  Debilities/Disabilities Identified: None  Emotional Behavior: Appropriate  Wt Readings from Last 3 Encounters:   02/15/22 47.7 kg (105 lb 1.6 oz)   12/16/21 45.8 kg (101 lb)   10/13/21 45.5 kg (100 lb 4.8 oz)     Ht Readings from Last 1 Encounters:   02/15/22 154.9 cm (61\")     Body mass index is 19.86 kg/m².  Physical Exam  Constitutional:       Appearance: She is well-developed. She is not diaphoretic.   HENT:      Head: Normocephalic and atraumatic.   Eyes:      General: No scleral icterus.     Conjunctiva/sclera: Conjunctivae normal.      Pupils: Pupils are equal, round, and reactive to light.   Neck:      Thyroid: No thyromegaly.   Cardiovascular:      Rate and Rhythm: Normal rate and regular rhythm.      Heart sounds: Normal heart sounds. No murmur heard.  No gallop.    Pulmonary:      Effort: Pulmonary effort is normal.      Breath sounds: Normal breath sounds. No wheezing or rales.   Abdominal:      General: Bowel sounds are normal. There is no distension or abdominal bruit.      Palpations: Abdomen is soft. There is no shifting dullness, fluid wave or mass.      Tenderness: There is abdominal tenderness in the left lower quadrant. There is no guarding. Negative signs " include Simms's sign.      Hernia: There is no hernia in the ventral area.   Musculoskeletal:         General: Normal range of motion.      Cervical back: Normal range of motion and neck supple.   Lymphadenopathy:      Cervical: No cervical adenopathy.   Skin:     General: Skin is warm and dry.      Findings: No erythema or rash.   Neurological:      Mental Status: She is alert and oriented to person, place, and time.   Psychiatric:         Mood and Affect: Mood normal.         Behavior: Behavior normal.         CLINICAL DATA REVIEWED   reviewed previous lab results and integrated with today's visit, reviewed notes from other physicians and/or last GI encounter, reviewed previous endoscopy results and available photos, reviewed surgical pathology results from previous biopsies    ASSESSMENT  Diagnoses and all orders for this visit:    Positive colorectal cancer screening using Cologuard test    Irritable bowel syndrome, unspecified type    Other orders  -     albuterol sulfate  (90 Base) MCG/ACT inhaler; INHALE 2 PUFFS EVERY 6 HOURS IF NEEDED FOR WHEEZING.          PLAN  Has colon scheduled so no new meds for now will have her check home dairy challenge and my rusty antispamotics  Return if symptoms worsen or fail to improve.    I have discussed the above plan with the patient.  They verbalize understanding and are in agreement with the plan.  They have been advised to contact the office for any questions, concerns, or changes related to their health.

## 2022-03-02 ENCOUNTER — OFFICE VISIT (OUTPATIENT)
Dept: OBSTETRICS AND GYNECOLOGY | Facility: CLINIC | Age: 52
End: 2022-03-02

## 2022-03-02 VITALS
HEIGHT: 61 IN | SYSTOLIC BLOOD PRESSURE: 98 MMHG | WEIGHT: 103.4 LBS | BODY MASS INDEX: 19.52 KG/M2 | DIASTOLIC BLOOD PRESSURE: 62 MMHG

## 2022-03-02 DIAGNOSIS — Z01.419 PAP SMEAR, LOW-RISK: Primary | ICD-10-CM

## 2022-03-02 DIAGNOSIS — R14.0 BLOATING: ICD-10-CM

## 2022-03-02 DIAGNOSIS — Z79.890 HORMONE REPLACEMENT THERAPY (HRT): ICD-10-CM

## 2022-03-02 DIAGNOSIS — Z12.31 ENCOUNTER FOR SCREENING MAMMOGRAM FOR MALIGNANT NEOPLASM OF BREAST: ICD-10-CM

## 2022-03-02 DIAGNOSIS — Z11.51 SCREENING FOR HUMAN PAPILLOMAVIRUS: ICD-10-CM

## 2022-03-02 DIAGNOSIS — Z01.419 ROUTINE GYNECOLOGICAL EXAMINATION: ICD-10-CM

## 2022-03-02 DIAGNOSIS — N80.03 ADENOMYOSIS: ICD-10-CM

## 2022-03-02 LAB
BILIRUB BLD-MCNC: NEGATIVE MG/DL
CLARITY, POC: CLEAR
COLOR UR: YELLOW
GLUCOSE UR STRIP-MCNC: NEGATIVE MG/DL
KETONES UR QL: NEGATIVE
LEUKOCYTE EST, POC: NEGATIVE
NITRITE UR-MCNC: NEGATIVE MG/ML
PH UR: 7.5 [PH] (ref 5–8)
PROT UR STRIP-MCNC: NEGATIVE MG/DL
RBC # UR STRIP: NEGATIVE /UL
SP GR UR: 1 (ref 1–1.03)
UROBILINOGEN UR QL: NORMAL

## 2022-03-02 PROCEDURE — 99214 OFFICE O/P EST MOD 30 MIN: CPT | Performed by: OBSTETRICS & GYNECOLOGY

## 2022-03-02 PROCEDURE — 81002 URINALYSIS NONAUTO W/O SCOPE: CPT | Performed by: OBSTETRICS & GYNECOLOGY

## 2022-03-02 PROCEDURE — 99396 PREV VISIT EST AGE 40-64: CPT | Performed by: OBSTETRICS & GYNECOLOGY

## 2022-03-02 NOTE — PROGRESS NOTES
GYN Annual Exam     CC- Here for annual exam.     Purnima Altamirano is a 52 y.o. female established patient who presents for annual well woman exam and discussion of HRT. She had gone to a free standing HRT clinic and did an estrogen pellet, testosterone cream daily and cyclic progesterone. She was very jittery and felt unwell so I had her stop the E2 pellet and she is been using testosterone cream 3 days a week and prometrium 100 mg QHS and she feels good. She is having cycles every 23-28 days that are still heavy but are shorter. She had declined an US last visit but is still having bloating and is agreeable to imaging now. She is trying to avoid any therapy for her periods and wants to try to make it to menopause.  She has her C scope scheduled for 2022.     OB History        2    Para   2    Term   2            AB        Living   2       SAB        IAB        Ectopic        Molar        Multiple        Live Births              Obstetric Comments   2              Menarche:12  Menopause:not yet  HRT: yes, 6 months  Current contraception: condoms  History of abnormal Pap smear: yes -  s/p cryo and normal followup   History of abnormal mammogram: no  Family history of uterine, colon or ovarian cancer: no  Family history of breast cancer: no  STD's: none  Last pap smear:10/2019 nl pap/HPV  Gardasil: none  HALEY: none   Adenomyosis  PMDD      Health Maintenance   Topic Date Due   • ANNUAL PHYSICAL  Never done   • Pneumococcal Vaccine 0-64 (1 of 2 - PPSV23) Never done   • TDAP/TD VACCINES (1 - Tdap) Never done   • ZOSTER VACCINE (1 of 2) Never done   • INFLUENZA VACCINE  2021   • Annual Gynecologic Pelvic and Breast Exam  10/22/2021   • COVID-19 Vaccine (3 - Booster for Pfizer series) 2021   • PAP SMEAR  10/14/2022   • DXA SCAN  2023   • MAMMOGRAM  2023   • COLORECTAL CANCER SCREENING  02/15/2025   • HEPATITIS C SCREENING  Completed       Past Medical History:   Diagnosis Date    • Abnormal Pap smear of cervix    • Adenomyosis    • Cervical dysplasia     Status post laser with normal follow-ups   • IBS (irritable bowel syndrome)    • PMS (premenstrual syndrome)        Past Surgical History:   Procedure Laterality Date   • AUGMENTATION MAMMAPLASTY Bilateral    • CERVIX LESION DESTRUCTION     • COLONOSCOPY      4 or 5 years ago Dr. Russell    • KNEE SURGERY Left    • MAMMO BILATERAL  01/2016         Current Outpatient Medications:   •  albuterol sulfate  (90 Base) MCG/ACT inhaler, INHALE 2 PUFFS EVERY 6 HOURS IF NEEDED FOR WHEEZING., Disp: , Rfl:   •  amitriptyline (ELAVIL) 50 MG tablet, Take 1 tablet by mouth Every Night., Disp: 90 tablet, Rfl: 3  •  Calcium-Magnesium 500-250 MG tablet, Take  by mouth., Disp: , Rfl:   •  gabapentin (NEURONTIN) 100 MG capsule, Take 1-3 tablets by mouth daily at night, Disp: , Rfl:   •  ibuprofen (ADVIL,MOTRIN) 600 MG tablet, Take 1 tablet by mouth Every 8 (Eight) Hours As Needed for Mild Pain ., Disp: 30 tablet, Rfl: 0  •  Progesterone (PROMETRIUM) 100 MG capsule, Take 1 capsule by mouth every night at bedtime., Disp: 90 capsule, Rfl: 1  •  SUMAtriptan (IMITREX) 100 MG tablet, Take 1 tablet by mouth Daily As Needed for Migraine., Disp: 30 tablet, Rfl: 11    Allergies   Allergen Reactions   • Cefdinir        Social History     Tobacco Use   • Smoking status: Never Smoker   • Smokeless tobacco: Never Used   Vaping Use   • Vaping Use: Never used   Substance Use Topics   • Alcohol use: No   • Drug use: No       Family History   Problem Relation Age of Onset   • Stroke Maternal Grandmother    • Heart attack Mother    • Heart attack Sister    • Breast cancer Neg Hx    • Ovarian cancer Neg Hx    • Colon cancer Neg Hx    • Deep vein thrombosis Neg Hx    • Pulmonary embolism Neg Hx    • Colon polyps Neg Hx        Review of Systems   Constitutional: Positive for activity change. Negative for appetite change, fatigue, fever and unexpected weight change.   Eyes:  "Negative for photophobia and visual disturbance.   Respiratory: Negative for cough and shortness of breath.    Cardiovascular: Negative for chest pain and palpitations.   Gastrointestinal: Positive for abdominal distention. Negative for abdominal pain, constipation, diarrhea, nausea and rectal pain.   Endocrine: Positive for heat intolerance (improved). Negative for cold intolerance.   Genitourinary: Negative for dyspareunia, dysuria, menstrual problem, pelvic pain, vaginal bleeding and vaginal discharge.   Musculoskeletal: Negative for back pain.   Skin: Negative for color change and rash.   Neurological: Negative for headaches.   Hematological: Negative for adenopathy. Does not bruise/bleed easily.   Psychiatric/Behavioral: Negative for dysphoric mood. The patient is not nervous/anxious.    All other systems reviewed and are negative.      BP 98/62   Ht 154.9 cm (61\")   Wt 46.9 kg (103 lb 6.4 oz)   LMP 02/17/2022 (Exact Date)   BMI 19.54 kg/m²     Physical Exam   Constitutional: She is oriented to person, place, and time. She appears well-developed.   HENT:   Head: Normocephalic and atraumatic.   Eyes: Conjunctivae are normal. No scleral icterus.   Neck: No thyromegaly present.   Cardiovascular: Normal rate, regular rhythm and normal heart sounds.   Pulmonary/Chest: Effort normal and breath sounds normal. Right breast exhibits no inverted nipple, no mass, no nipple discharge, no skin change and no tenderness. Left breast exhibits no inverted nipple, no mass, no nipple discharge, no skin change and no tenderness.   B implants noted   Abdominal: Soft. Normal appearance and bowel sounds are normal. She exhibits no distension and no mass. There is no abdominal tenderness. There is no rebound and no guarding. No hernia.   Genitourinary:    Rectum normal.      Pelvic exam was performed with patient supine.   There is no rash, tenderness, lesion or injury on the right labia. There is no rash, tenderness, lesion or " injury on the left labia. Uterus is enlarged and tender. Uterus is not deviated and not fixed. Cervix exhibits no motion tenderness, no lesion, no discharge and no friability. Right adnexum displays no mass, no tenderness and no fullness. Left adnexum displays no mass, no tenderness and no fullness.    No vaginal discharge, erythema, tenderness or bleeding.   No erythema, tenderness or bleeding in the vagina.    No foreign body in the vagina.      No signs of injury or lesions in the vagina.      Genitourinary Comments: Exam c/w adenomyosis     Neurological: She is alert and oriented to person, place, and time.   Skin: Skin is warm and dry.   Psychiatric: Her behavior is normal. Mood, judgment and thought content normal.   Nursing note and vitals reviewed.         Assessment/Plan    1) GYN HM: normal pap/HPV 10/2019, check pap/HPV SBE demonstrated and encouraged.  2) STD screening: declines Condoms encouraged.  3) Bone health - Weight bearing exercise, dietary calcium recommendations and vitamin D reviewed.   4) Diet and Exercise discussed  5) Smoking Status: No  6) Bloating- schedule TVUS and appt in next 3-4 weeks  7)MMG: UTD 12/2021 normal, repeat 12/2022  9)C scope- positive Cologard, has C scope scheduled 4/2022  10) PMDD- no issues per pt. Declines meds  11) Adenomyosis- exam stable. Pt declines treatment.   12) DEXA- plan age 54  13) HRT- pt using Prometrium 100 mg QHS and testosterone cream QOD. Patient counseled that hormone treatment should be used to help with specific symptoms related to menopause such as hot flashes, night sweats and vaginal atrophy.  Hormones should not be given for “general wellbeing” or to avoid aging. The lowest dose hormone that treats symptoms should be used for the shortest amount of time possible.  Although estrogen is the most effective treatment for hot flashes, other non hormonal options exist (such as Brisdelle or venlafaxine) and should also be considered.  Anyone with an  intact uterus should also receive progestogen to prevent uterine overgrowth that can lead to uterine cancer.  All hormone therapy, whether it is synthetic or bio identical, can lead to increased risk of stroke, heart attack and thromboembolic diseases.  These adverse events are more likely to develop in the first year of use and in patients who are older than the typical menopausal age.  Risks of estrogen dependent cancers such as breast cancer increase with prolonged use.  Attempts to wean hormone therapy should be discussed annually and particularly after three to five years of use.  Any side effects such as vaginal bleeding or pain should be reported immediately.    14)Parts of this document have been copied or forwarded from her previous visits and have been reviewed, updated and edited as indicated.   15)I saw the patient with a face mask, gloves and eye protection  The patient herself was masked.  Social distancing was observed as appropriate.  16)Follow up for US  and 1 year annual   17) I spent > 35 minutes on the separately reported service of HRT, bloating, PMDD, adenomyosis. This time is not included in the time used to support the annual E/M service also reported today.         Diagnoses and all orders for this visit:    1. Pap smear, low-risk (Primary)  -     IgP, Aptima HPV    2. Routine gynecological examination  -     POC Urinalysis Dipstick  -     IgP, Aptima HPV    3. Screening for human papillomavirus  -     IgP, Aptima HPV    4. Encounter for screening mammogram for malignant neoplasm of breast  -     Mammo Screening Bilateral With CAD; Future    5. Adenomyosis    6. Bloating    7. Hormone replacement therapy (HRT)        Lori Benito MD  3/2/2022    15:40 EST

## 2022-03-07 LAB
CYTOLOGIST CVX/VAG CYTO: NORMAL
CYTOLOGY CVX/VAG DOC CYTO: NORMAL
CYTOLOGY CVX/VAG DOC THIN PREP: NORMAL
DX ICD CODE: NORMAL
HIV 1 & 2 AB SER-IMP: NORMAL
HPV I/H RISK 4 DNA CVX QL PROBE+SIG AMP: NEGATIVE
OTHER STN SPEC: NORMAL
STAT OF ADQ CVX/VAG CYTO-IMP: NORMAL

## 2022-04-06 ENCOUNTER — OFFICE VISIT (OUTPATIENT)
Dept: OBSTETRICS AND GYNECOLOGY | Facility: CLINIC | Age: 52
End: 2022-04-06

## 2022-04-06 VITALS
SYSTOLIC BLOOD PRESSURE: 102 MMHG | BODY MASS INDEX: 19.26 KG/M2 | DIASTOLIC BLOOD PRESSURE: 62 MMHG | HEIGHT: 61 IN | WEIGHT: 102 LBS

## 2022-04-06 DIAGNOSIS — R14.0 ABDOMINAL BLOATING: Primary | ICD-10-CM

## 2022-04-06 DIAGNOSIS — G47.00 INSOMNIA, UNSPECIFIED TYPE: ICD-10-CM

## 2022-04-06 DIAGNOSIS — N95.1 MENOPAUSAL SYMPTOMS: ICD-10-CM

## 2022-04-06 DIAGNOSIS — Z79.890 HORMONE REPLACEMENT THERAPY (HRT): ICD-10-CM

## 2022-04-06 DIAGNOSIS — Z72.820 POOR SLEEP: ICD-10-CM

## 2022-04-06 PROCEDURE — 99213 OFFICE O/P EST LOW 20 MIN: CPT | Performed by: OBSTETRICS & GYNECOLOGY

## 2022-04-06 RX ORDER — PROGESTERONE 100 MG/1
200 CAPSULE ORAL NIGHTLY
Qty: 30 CAPSULE | Refills: 4 | Status: SHIPPED | OUTPATIENT
Start: 2022-04-06 | End: 2022-07-18 | Stop reason: SDUPTHER

## 2022-04-06 RX ORDER — MELOXICAM 15 MG/1
TABLET ORAL
COMMUNITY
Start: 2022-04-01 | End: 2022-04-12

## 2022-04-06 RX ORDER — AMITRIPTYLINE HYDROCHLORIDE 10 MG/1
TABLET, FILM COATED ORAL
COMMUNITY
Start: 2022-03-07 | End: 2022-04-06

## 2022-04-06 NOTE — PROGRESS NOTES
Purnima Altamirano is a 52 y.o. patient who presents for follow up of   Chief Complaint   Patient presents with   • Follow-up     us     52-year-old established patient here for ultrasound and discussion of bloating.  She was seen in March for an annual exam and was complaining of bloating.  She is having cycles every 20-27 days that are heavy but are shorter.  She is on Prometrium 100 mg nightly and has taken testosterone cream 3 days a week.  Her ultrasound today shows a 10.2 cm anteverted uterus with an EL of 0.9 cm.  Her ovaries are normal.  Her ultrasound is compared to her last scan on 10/21/2020.  We discussed that her exam and ultrasound are consistent with adenomyosis and her ultrasound is stable.  She still declines any additional treatment for her cycles.  She does complain of very poor sleep.  We discussed increasing her Prometrium to 200 mg and she would like to try this for a while and see if it helps.  She has a colonoscopy scheduled next week.      The following portions of the patient's history were reviewed and updated as appropriate: allergies, current medications and problem list.    Review of Systems   Constitutional: Positive for activity change. Negative for appetite change, fatigue, fever and unexpected weight change.   Eyes: Negative for photophobia and visual disturbance.   Respiratory: Negative for cough and shortness of breath.    Cardiovascular: Negative for chest pain and palpitations.   Gastrointestinal: Positive for abdominal distention. Negative for abdominal pain, constipation, diarrhea, nausea and rectal pain.   Endocrine: Positive for heat intolerance (improved). Negative for cold intolerance.   Genitourinary: Positive for vaginal bleeding. Negative for dyspareunia, dysuria, menstrual problem, pelvic pain and vaginal discharge.   Musculoskeletal: Negative for back pain.   Skin: Negative for color change and rash.   Neurological: Negative for headaches.   Hematological:  "Negative for adenopathy. Does not bruise/bleed easily.   Psychiatric/Behavioral: Positive for sleep disturbance. Negative for dysphoric mood. The patient is not nervous/anxious.    All other systems reviewed and are negative.      /62   Ht 154.9 cm (61\")   Wt 46.3 kg (102 lb)   LMP 04/02/2022   Breastfeeding No   BMI 19.27 kg/m²     Physical Exam  Vitals and nursing note reviewed.   Constitutional:       Appearance: Normal appearance. She is well-developed.   HENT:      Head: Normocephalic and atraumatic.   Eyes:      General: No scleral icterus.     Conjunctiva/sclera: Conjunctivae normal.   Neck:      Thyroid: No thyromegaly.   Abdominal:      General: There is no distension.      Palpations: Abdomen is soft. There is no mass.      Tenderness: There is no abdominal tenderness. There is no guarding or rebound.      Hernia: No hernia is present.   Skin:     General: Skin is warm and dry.   Neurological:      Mental Status: She is alert and oriented to person, place, and time.   Psychiatric:         Mood and Affect: Mood normal.         Behavior: Behavior normal.         Thought Content: Thought content normal.         Judgment: Judgment normal.         A/P:  1. Abd bloating-normal pelvic ultrasound.  Patient has colonoscopy scheduled next week.  No etiology for bloating found other than adenomyosis.  2. Menopausal symptoms and poor sleep-patient notices that her sleeping patterns are related to her cycles.  We will increase her Prometrium from 100 mg to 200 mg nightly.  She is to call me back in 2 months to give me an update on how she is feeling.  3. RHM- UTD annual 3/2022- normal pap/HPV  4. UTD 12/2021 MMG B2.   5. RTO 3/2023 annual or prn.    Assessment/Plan   Diagnoses and all orders for this visit:    1. Abdominal bloating (Primary)    2. Menopausal symptoms    3. Hormone replacement therapy (HRT)    4. Poor sleep    Other orders  -     Progesterone (PROMETRIUM) 100 MG capsule; Take 2 capsules by " mouth Every Night.  Dispense: 30 capsule; Refill: 4                 No follow-ups on file.      Lori Benito MD    4/6/2022  10:40 EDT

## 2022-04-12 NOTE — SIGNIFICANT NOTE
Education provided the Patient on the following:    - Nothing to Eat or Drink after MN the night before the procedure    - Avoid red/purple fluids while completing their bowel prep as ordered by physician  -Contact Gastrointerologist office for any questions about specific details regarding colon prep    -You will need to have someone drive you home after your colonoscopy and remain with you for 24 hours after the procedure  - The date of your Surgery, you may have one visitor at bedside or within 10-15 minutes of [facility]  -Please wear warm socks when you arrive for your colonoscopy  -Remove all jewelry and leave any valuables before arriving the day of your procedure (all will have to be removed before leaving preop)  -You will need to arrive at 0915 on 4/15/22 for your colonoscopy    -Feel free to contact us at: [phone] with any additional questions/concerns       HISTORY OF PRESENT ILLNESS:  Radha Matta is a 39 year old female who presents to the office today for follow-up.  Chief Complaint   Patient presents with   • Follow-up     migraines, lost 15 pounds on meal plan, sophy   Patient was seen in August of last year and was put on Imitrex for migraines.    She reported half a been Imitrex when the migraine starts and another 1 later if it does not resolve and has been working well for her.  Migraines have also decreased to 1-2 times every few weeks.  Patient has not established with Neurology as it is improving.    She did report a history of allergies and Zyrtec and Flonase were given and she does use medications only as needed and it is improving.    Patient was actively working on weight loss and now has started a meal program [employer] and reports losing about 15 lb in about 2 months.  Her she does see better and attributes her allergies and migraines improving due to this as well.  It requires her to eat smaller meals about 6 of them a day, hydrate well, clean foods that are half prepped and also packaged.    She is dealing with her father moving here from [place] who was recently diagnosed with prostate cancer is getting care here.  She is planning on doing a road trip through the [facility] with her father and family in the next week or so and would like a refill on Imitrex if possible.    Patient also would like to get labs drawn.      Past Medical History:   Diagnosis Date   • Family history of skin cancer    • Migraines    • Overweight      Past Surgical History:   Procedure Laterality Date   • Breast augmentation with implant     •  section, low transverse      x 2   • Inguinal hernia repair Left    • Partial hysterectomy  2016    hysterectomy with left oopherectomy   • Peetz tooth extraction       Family History   Problem Relation Age of Onset   • High blood pressure Mother    • Cancer Mother         skin   • Thyroid Mother          hypo   • Diabetes Mother    • Miscarriages / Stillbirths Mother         1   • High blood pressure Father    • Cancer Father         skin   • Thyroid Father         hypo   • Cancer Sister         skin   • Thyroid Sister    • Anxiety disorder Sister    • Hypertension Sister    • Stroke Maternal Grandfather    • Diabetes Maternal Uncle    • Alcohol Abuse Maternal Uncle         alcohol     Social History     Socioeconomic History   • Marital status: /Civil Union     Spouse name: Not on file   • Number of children: 1   • Years of education: Not on file   • Highest education level: Not on file   Occupational History   • Occupation:      Employer: [employer]     Comment: full time   Tobacco Use   • Smoking status: Never Smoker   • Smokeless tobacco: Never Used   Substance and Sexual Activity   • Alcohol use: Yes     Comment: occ   • Drug use: No   • Sexual activity: Yes     Partners: Male     Birth control/protection: None   Other Topics Concern   •  Service Not Asked   • Blood Transfusions Not Asked   • Caffeine Concern Not Asked   • Occupational Exposure Not Asked   • Hobby Hazards Not Asked   • Sleep Concern Not Asked   • Stress Concern Not Asked   • Weight Concern Not Asked   • Special Diet Not Asked   • Back Care Not Asked   • Exercise Yes     Comment: kickfit and kickboxing 4-6 times per week   • Bike Helmet Not Asked   • Seat Belt Not Asked   • Self-Exams Not Asked   Social History Narrative    Lives with  and daughter     Social Determinants of Health     Financial Resource Strain:    • Social Determinants: Financial Resource Strain:    Food Insecurity:    • Social Determinants: Food Insecurity:    Transportation Needs:    • Lack of Transportation (Medical):    • Lack of Transportation (Non-Medical):    Physical Activity:    • Days of Exercise per Week:    • Minutes of Exercise per Session:    Stress:    • Social Determinants: Stress:    Social Connections:    • Social  Determinants: Social Connections:    Intimate Partner Violence:    • Social Determinants: Intimate Partner Violence Past Fear:    • Social Determinants: Intimate Partner Violence Current Fear:        Current Outpatient Medications   Medication Sig Dispense Refill   • SUMAtriptan (Imitrex) 25 MG tablet Take 1 tablet by mouth at onset of migraine. May repeat after 2 hours if needed. 9 tablet 3   • cetirizine (ZyrTEC Allergy) 10 MG tablet Take 1 tablet by mouth daily. 30 tablet 0   • fluticasone (FLONASE) 50 MCG/ACT nasal spray Spray 2 sprays in each nostril daily. 16 g 1   • Multiple Vitamin (MULTI-VITAMIN DAILY PO)        No current facility-administered medications for this visit.     ALLERGIES:   Allergen Reactions   • Shell Fish ANAPHYLAXIS   • Banana Other (See Comments)        REVIEW OF SYSTEMS:  Constitutional: No fever or chills, no change in weight.   Cardiovascular: No chest pain, lower extremity edema, palpitations, syncope.  Respiratory: No shortness of breath, cough, sputum, hemoptysis.  Gastrointestinal: No abdominal pain, nausea, vomiting. No diarrhea, constipation.    PHYSICAL EXAM:  VITAL SIGNS:    Visit Vitals  /60   Resp 16   Wt 74.8 kg   LMP 02/22/2017 (Exact Date)   BMI 28.32 kg/m²      GENERAL:  Alert, oriented, no apparent distress.    HEAD: Normal.   EYES: Pupils equal. Normal conjunctiva. No discharge visible.   CARDIOVASCULAR: S1, S2 heard. Normal rate. Normal rhythm. No murmurs audible.    RESPIRATORY: Normal breath sounds. No crackles. No wheezing. Good air entry.  EXTREMITIES: No edema. No cyanosis.        ASSESSMENT/PLAN:  1. Migraine headaches-improved with Imitrex although patient reports only using half at a time.    2. Seasonal allergies-improved with Zyrtec Flonase as needed.  If worsening consider adding Singulair or seeing an allergist.  Discussed eye allergies and over-the-counter antihistamine eyedrops if needed.    3. Obesity-patient has late and continues with healthy  lifestyle changes with her new program.  Support encouragement offered and congratulated patient on efforts.  Labs to be checked with vitamin-D levels history of low vitamin-D in the past.    Patient can follow up for a physical in the future.    Radha was seen today for follow-up.    Diagnoses and all orders for this visit:    Migraine without status migrainosus, not intractable, unspecified migraine type    Seasonal allergies    H/O vitamin D deficiency  -     VITAMIN D -25 HYDROXY; Future    General medical exam  -     CBC WITH DIFFERENTIAL; Future  -     COMPREHENSIVE METABOLIC PANEL; Future  -     THYROID STIMULATING HORMONE; Future  -     LIPID PANEL WITH REFLEX; Future    Other orders  -     SUMAtriptan (Imitrex) 25 MG tablet; Take 1 tablet by mouth at onset of migraine. May repeat after 2 hours if needed.

## 2022-04-15 ENCOUNTER — ANESTHESIA (OUTPATIENT)
Dept: SURGERY | Facility: SURGERY CENTER | Age: 52
End: 2022-04-15

## 2022-04-15 ENCOUNTER — HOSPITAL ENCOUNTER (OUTPATIENT)
Facility: SURGERY CENTER | Age: 52
Setting detail: HOSPITAL OUTPATIENT SURGERY
Discharge: HOME | End: 2022-04-15
Attending: INTERNAL MEDICINE | Admitting: INTERNAL MEDICINE

## 2022-04-15 ENCOUNTER — ANESTHESIA EVENT (OUTPATIENT)
Dept: SURGERY | Facility: SURGERY CENTER | Age: 52
End: 2022-04-15

## 2022-04-15 VITALS
WEIGHT: 100.6 LBS | TEMPERATURE: 98.6 F | SYSTOLIC BLOOD PRESSURE: 102 MMHG | RESPIRATION RATE: 20 BRPM | HEART RATE: 73 BPM | OXYGEN SATURATION: 100 % | DIASTOLIC BLOOD PRESSURE: 68 MMHG | HEIGHT: 61 IN | BODY MASS INDEX: 18.99 KG/M2

## 2022-04-15 LAB
B-HCG UR QL: NEGATIVE
EXPIRATION DATE: NORMAL
INTERNAL NEGATIVE CONTROL: NEGATIVE
INTERNAL POSITIVE CONTROL: POSITIVE
Lab: NORMAL

## 2022-04-15 PROCEDURE — 45378 DIAGNOSTIC COLONOSCOPY: CPT | Performed by: INTERNAL MEDICINE

## 2022-04-15 PROCEDURE — 81025 URINE PREGNANCY TEST: CPT | Performed by: INTERNAL MEDICINE

## 2022-04-15 PROCEDURE — 25010000002 PROPOFOL 10 MG/ML EMULSION: Performed by: ANESTHESIOLOGY

## 2022-04-15 RX ORDER — MAGNESIUM HYDROXIDE 1200 MG/15ML
LIQUID ORAL AS NEEDED
Status: DISCONTINUED | OUTPATIENT
Start: 2022-04-15 | End: 2022-04-15 | Stop reason: HOSPADM

## 2022-04-15 RX ORDER — FENTANYL CITRATE 50 UG/ML
25 INJECTION, SOLUTION INTRAMUSCULAR; INTRAVENOUS
Status: DISCONTINUED | OUTPATIENT
Start: 2022-04-15 | End: 2022-04-15 | Stop reason: HOSPADM

## 2022-04-15 RX ORDER — SODIUM CHLORIDE 9 MG/ML
1000 INJECTION, SOLUTION INTRAVENOUS CONTINUOUS
Status: DISCONTINUED | OUTPATIENT
Start: 2022-04-15 | End: 2022-04-15 | Stop reason: HOSPADM

## 2022-04-15 RX ORDER — LIDOCAINE HYDROCHLORIDE 10 MG/ML
0.5 INJECTION, SOLUTION INFILTRATION; PERINEURAL ONCE AS NEEDED
Status: DISCONTINUED | OUTPATIENT
Start: 2022-04-15 | End: 2022-04-15 | Stop reason: HOSPADM

## 2022-04-15 RX ORDER — SODIUM CHLORIDE, SODIUM LACTATE, POTASSIUM CHLORIDE, CALCIUM CHLORIDE 600; 310; 30; 20 MG/100ML; MG/100ML; MG/100ML; MG/100ML
1000 INJECTION, SOLUTION INTRAVENOUS CONTINUOUS
Status: DISCONTINUED | OUTPATIENT
Start: 2022-04-15 | End: 2022-04-15 | Stop reason: HOSPADM

## 2022-04-15 RX ORDER — LIDOCAINE HYDROCHLORIDE 20 MG/ML
INJECTION, SOLUTION INFILTRATION; PERINEURAL AS NEEDED
Status: DISCONTINUED | OUTPATIENT
Start: 2022-04-15 | End: 2022-04-15 | Stop reason: SURG

## 2022-04-15 RX ORDER — SODIUM CHLORIDE 0.9 % (FLUSH) 0.9 %
10 SYRINGE (ML) INJECTION AS NEEDED
Status: DISCONTINUED | OUTPATIENT
Start: 2022-04-15 | End: 2022-04-15 | Stop reason: HOSPADM

## 2022-04-15 RX ORDER — ONDANSETRON 2 MG/ML
4 INJECTION INTRAMUSCULAR; INTRAVENOUS ONCE AS NEEDED
Status: DISCONTINUED | OUTPATIENT
Start: 2022-04-15 | End: 2022-04-15 | Stop reason: HOSPADM

## 2022-04-15 RX ADMIN — LIDOCAINE HYDROCHLORIDE 60 MG: 20 INJECTION, SOLUTION INFILTRATION; PERINEURAL at 09:57

## 2022-04-15 RX ADMIN — SODIUM CHLORIDE, POTASSIUM CHLORIDE, SODIUM LACTATE AND CALCIUM CHLORIDE 1000 ML: 600; 310; 30; 20 INJECTION, SOLUTION INTRAVENOUS at 09:50

## 2022-04-15 RX ADMIN — PROPOFOL 200 MCG/KG/MIN: 10 INJECTION, EMULSION INTRAVENOUS at 09:59

## 2022-04-15 NOTE — BRIEF OP NOTE
COLONOSCOPY  Progress Note    Purnima Altamirano  4/15/2022    Pre-op Diagnosis:   Screen for colon cancer [Z12.11]       Post-Op Diagnosis Codes:     * Screen for colon cancer [Z12.11]    Procedure/CPT® Codes:        Procedure(s):  COLONOSCOPY    Surgeon(s):  Marvin Ulloa MD    Anesthesia: Monitored Anesthesia Care    Staff:   Endo Technician: Atul Adams  Endo Nurse: Salma Chris RN; Mignon Calix RN         Estimated Blood Loss: none    Urine Voided: * No values recorded between 4/15/2022  9:53 AM and 4/15/2022 10:28 AM *    Specimens:                None          Drains: * No LDAs found *    Findings: Difficult(Looping/Poor Prep) Colon to Cecum  Normal Mucosa    Complications: None          Marvin Ulloa MD     Date: 4/15/2022  Time: 10:29 EDT

## 2022-04-15 NOTE — ANESTHESIA POSTPROCEDURE EVALUATION
Patient: Purnima Altamirano    Procedure Summary     Date: 04/15/22 Room / Location: [facility]    Anesthesia Start: 0953 Anesthesia Stop: 1033    Procedure: COLONOSCOPY (N/A ) Diagnosis:       Screen for colon cancer      Constipation      (Screen for colon cancer [Z12.11])    Surgeons: Marvin Ulloa MD Provider: Hunter Butts MD    Anesthesia Type: MAC ASA Status: 2          Anesthesia Type: MAC    Vitals  Vitals Value Taken Time   BP 94/71 04/15/22 1045   Temp 37 °C (98.6 °F) 04/15/22 1032   Pulse 71 04/15/22 1045   Resp 20 04/15/22 1045   SpO2 100 % 04/15/22 1045           Post Anesthesia Care and Evaluation    Patient location during evaluation: bedside  Pain management: adequate  Airway patency: patent  Anesthetic complications: No anesthetic complications    Cardiovascular status: acceptable  Respiratory status: acceptable  Hydration status: acceptable

## 2022-04-15 NOTE — ANESTHESIA PREPROCEDURE EVALUATION
Anesthesia Evaluation     NPO Solid Status: > 8 hours             Airway   Mallampati: II  TM distance: >3 FB  Neck ROM: full  Dental - normal exam     Pulmonary - normal exam   (+) asthma,  Cardiovascular - normal exam        Neuro/Psych  (+) headaches,    GI/Hepatic/Renal/Endo      Musculoskeletal     Abdominal    Substance History      OB/GYN          Other                        Anesthesia Plan    ASA 2     MAC       Anesthetic plan, all risks, benefits, and alternatives have been provided, discussed and informed consent has been obtained with: patient.        CODE STATUS:

## 2022-04-15 NOTE — H&P
Patient Care Team:  Trace Min MD as PCP - General (Family Medicine)  Rebecca Green MD (Dermatology)  Marvin Ulloa MD as Consulting Physician (Gastroenterology)    CHIEF COMPLAINT: Screening CRC    HISTORY OF PRESENT ILLNESS:  Last exam was >10 years    Past Medical History:   Diagnosis Date   • Abnormal Pap smear of cervix    • Adenomyosis    • Cervical dysplasia     Status post laser with normal follow-ups   • IBS (irritable bowel syndrome)    • Migraines    • PMS (premenstrual syndrome)      Past Surgical History:   Procedure Laterality Date   • AUGMENTATION MAMMAPLASTY Bilateral    • CERVIX LESION DESTRUCTION     • COLONOSCOPY      4 or 5 years ago Dr. Russell    • KNEE SURGERY Left    • MAMMO BILATERAL  01/2016     Family History   Problem Relation Age of Onset   • Stroke Maternal Grandmother    • Heart attack Mother    • Heart attack Sister    • Breast cancer Neg Hx    • Ovarian cancer Neg Hx    • Colon cancer Neg Hx    • Deep vein thrombosis Neg Hx    • Pulmonary embolism Neg Hx    • Colon polyps Neg Hx      Social History     Tobacco Use   • Smoking status: Never Smoker   • Smokeless tobacco: Never Used   Vaping Use   • Vaping Use: Never used   Substance Use Topics   • Alcohol use: No   • Drug use: No     Medications Prior to Admission   Medication Sig Dispense Refill Last Dose   • amitriptyline (ELAVIL) 50 MG tablet Take 1 tablet by mouth Every Night. 90 tablet 3 4/14/2022 at Unknown time   • Calcium-Magnesium 500-250 MG tablet Take  by mouth.   Past Week at Unknown time   • gabapentin (NEURONTIN) 100 MG capsule Take 1-3 tablets by mouth daily at night   4/14/2022 at Unknown time   • Progesterone (PROMETRIUM) 100 MG capsule Take 2 capsules by mouth Every Night. 30 capsule 4 4/14/2022 at Unknown time   • SUMAtriptan (IMITREX) 100 MG tablet Take 1 tablet by mouth Daily As Needed for Migraine. 30 tablet 11 Past Month at Unknown time   • albuterol sulfate  (90 Base) MCG/ACT  "inhaler INHALE 2 PUFFS EVERY 6 HOURS IF NEEDED FOR WHEEZING.   More than a month at Unknown time   • ibuprofen (ADVIL,MOTRIN) 600 MG tablet Take 1 tablet by mouth Every 8 (Eight) Hours As Needed for Mild Pain . 30 tablet 0 More than a month at Unknown time     Allergies:  Cefdinir    REVIEW OF SYSTEMS:  Please see the above history of present illness for pertinent positives and negatives.  The remainder of the patient's systems have been reviewed and are negative.     Vital Signs  Temp:  [97.7 °F (36.5 °C)] 97.7 °F (36.5 °C)  Heart Rate:  [78] 78  Resp:  [16] 16  BP: (91)/(53) 91/53    Flowsheet Rows    Flowsheet Row First Filed Value   Admission Height 154.9 cm (61\") Documented at 04/15/2022 0941   Admission Weight 45.6 kg (100 lb 9.6 oz) Documented at 04/15/2022 0941           Physical Exam:  Physical Exam   Constitutional: Patient appears well-developed and well-nourished and in no acute distress   HEENT:   Head: Normocephalic and atraumatic.   Eyes:  Pupils are equal, round, and reactive to light. EOM are intact. Sclerae are anicteric and non-injected.  Mouth and Throat: Patient has moist mucous membranes. Oropharynx is clear of any erythema or exudate.     Neck: Neck supple. No JVD present. No thyromegaly present. No lymphadenopathy present.  Cardiovascular: Regular rate, regular rhythm, S1 normal and S2 normal.  Exam reveals no gallop and no friction rub.  No murmur heard.  Pulmonary/Chest: Lungs are clear to auscultation bilaterally. No respiratory distress. No wheezes. No rhonchi. No rales.   Abdominal: Soft. Bowel sounds are normal. No distension and no mass. There is no hepatosplenomegaly. There is no tenderness.   Musculoskeletal: Normal Muscle tone  Extremities: No edema. Pulses are palpable in all 4 extremities.  Neurological: Patient is alert and oriented to person, place, and time. Cranial nerves II-XII are grossly intact with no focal deficits.  Skin: Skin is warm. No rash noted. Nails show no " clubbing.  No cyanosis or erythema.    Debilities/Disabilities Identified: None  Emotional Behavior: Appropriate     Results Review:   I reviewed the patient's new clinical results.    Lab Results (most recent)     Procedure Component Value Units Date/Time    POC Urine Pregnancy [[id number]]  (Normal) Collected: 04/15/22 0942    Specimen: Urine Updated: 04/15/22 0943     HCG, Urine, QL Negative     Lot Number [id number]     Internal Positive Control Positive     Internal Negative Control Negative     Expiration Date 05/31/2023          Imaging Results (Most Recent)     None        reviewed    ECG/EMG Results (most recent)     None        reviewed    Assessment/Plan   Screening CRC/  colonoscopy      I discussed the patient's findings and my recommendations with patient.     Marvin Ulloa MD  04/15/22  09:50 EDT    Time: 10 min prior to procedure.

## 2022-07-18 RX ORDER — PROGESTERONE 200 MG/1
200 CAPSULE ORAL NIGHTLY
Qty: 30 CAPSULE | Refills: 4 | Status: SHIPPED | OUTPATIENT
Start: 2022-07-18

## 2022-07-18 NOTE — TELEPHONE ENCOUNTER
Caller: Purnima Altamirano    Relationship: Self    Best call back number: [phone]    Requested Prescriptions: PROMETRIUM  Requested Prescriptions      No prescriptions requested or ordered in this encounter        Pharmacy where request should be sent: [facility] - [place], [place] - [address] - [phone] [facility] [phone] FX     Additional details provided by patient: PT HAS LESS THAN 3 DAYS AND WANTS TO CHANGE PHARMACY TO [facility] [place]    Does the patient have less than a 3 day supply:  [x] Yes  [] No    Lyssa BAL Rep   07/18/22 08:51 EDT

## 2022-07-25 DIAGNOSIS — G43.909 MIGRAINE WITHOUT STATUS MIGRAINOSUS, NOT INTRACTABLE, UNSPECIFIED MIGRAINE TYPE: ICD-10-CM

## 2022-07-25 DIAGNOSIS — G47.00 INSOMNIA, UNSPECIFIED TYPE: ICD-10-CM

## 2022-07-25 RX ORDER — AMITRIPTYLINE HYDROCHLORIDE 50 MG/1
50 TABLET, FILM COATED ORAL NIGHTLY
Qty: 90 TABLET | Refills: 3 | Status: SHIPPED | OUTPATIENT
Start: 2022-07-25 | End: 2022-12-21

## 2022-07-25 RX ORDER — SUMATRIPTAN 100 MG/1
100 TABLET, FILM COATED ORAL DAILY PRN
Qty: 30 TABLET | Refills: 11 | Status: SHIPPED | OUTPATIENT
Start: 2022-07-25

## 2022-08-25 ENCOUNTER — TRANSCRIBE ORDERS (OUTPATIENT)
Dept: ADMINISTRATIVE | Facility: HOSPITAL | Age: 52
End: 2022-08-25

## 2022-08-25 DIAGNOSIS — Z12.31 VISIT FOR SCREENING MAMMOGRAM: Primary | ICD-10-CM

## 2022-12-19 DIAGNOSIS — G47.00 INSOMNIA, UNSPECIFIED TYPE: ICD-10-CM

## 2022-12-19 DIAGNOSIS — G43.909 MIGRAINE WITHOUT STATUS MIGRAINOSUS, NOT INTRACTABLE, UNSPECIFIED MIGRAINE TYPE: ICD-10-CM

## 2022-12-20 ENCOUNTER — HOSPITAL ENCOUNTER (OUTPATIENT)
Dept: MAMMOGRAPHY | Facility: HOSPITAL | Age: 52
Discharge: HOME | End: 2022-12-20
Admitting: OBSTETRICS & GYNECOLOGY

## 2022-12-20 DIAGNOSIS — Z12.31 VISIT FOR SCREENING MAMMOGRAM: ICD-10-CM

## 2022-12-20 PROCEDURE — 77067 SCR MAMMO BI INCL CAD: CPT

## 2022-12-20 PROCEDURE — 77063 BREAST TOMOSYNTHESIS BI: CPT

## 2022-12-20 NOTE — TELEPHONE ENCOUNTER
Rx Refill Note  Requested Prescriptions     Pending Prescriptions Disp Refills    amitriptyline (ELAVIL) 50 MG tablet [Pharmacy Med Name: AMITRIPTYLIN 50MG] 90 tablet 0     Sig: TAKE 1 TABLET BY MOUTH EVERY EVENING      Last office visit with prescribing clinician: 10/13/2021   Last telemedicine visit with prescribing clinician: Visit date not found   Next office visit with prescribing clinician: Visit date not found                         Would you like a call back once the refill request has been completed: [] Yes [] No    If the office needs to give you a call back, can they leave a voicemail: [] Yes [] No    Irlanda Rodríguez MA  12/20/22, 10:15 EST

## 2022-12-20 NOTE — TELEPHONE ENCOUNTER
Caller: Renny Altamiranoistina L    Relationship: Self    Best call back number: [phone]    Requested Prescriptions:   Requested Prescriptions     Pending Prescriptions Disp Refills   • amitriptyline (ELAVIL) 50 MG tablet [Pharmacy Med Name: AMITRIPTYLIN 50MG] 90 tablet 0     Sig: TAKE 1 TABLET BY MOUTH EVERY EVENING        Pharmacy where request should be sent: [facility] - [place], [place] - [address] [facility] [phone] [facility] [phone] FX     Additional details provided by patient: PATIENT STATES THAT SHE HAS 3 DAYS LEFT     Does the patient have less than a 3 day supply:  [] Yes  [x] No    Would you like a call back once the refill request has been completed: [x] Yes [] No    If the office needs to give you a call back, can they leave a voicemail: [x] Yes [] No    Lyssa Almonte Rep   12/20/22 08:49 EST

## 2022-12-21 RX ORDER — AMITRIPTYLINE HYDROCHLORIDE 50 MG/1
50 TABLET, FILM COATED ORAL EVERY EVENING
Qty: 30 TABLET | Refills: 0 | Status: SHIPPED | OUTPATIENT
Start: 2022-12-21

## 2023-09-16 DIAGNOSIS — G43.909 MIGRAINE WITHOUT STATUS MIGRAINOSUS, NOT INTRACTABLE, UNSPECIFIED MIGRAINE TYPE: ICD-10-CM

## 2023-09-16 DIAGNOSIS — G47.00 INSOMNIA, UNSPECIFIED TYPE: ICD-10-CM

## 2023-09-18 DIAGNOSIS — G43.909 MIGRAINE WITHOUT STATUS MIGRAINOSUS, NOT INTRACTABLE, UNSPECIFIED MIGRAINE TYPE: ICD-10-CM

## 2023-09-18 RX ORDER — SUMATRIPTAN 100 MG/1
100 TABLET, FILM COATED ORAL DAILY PRN
Qty: 30 TABLET | Refills: 10 | OUTPATIENT
Start: 2023-09-18

## 2023-09-18 RX ORDER — AMITRIPTYLINE HYDROCHLORIDE 50 MG/1
TABLET, FILM COATED ORAL
Qty: 90 TABLET | Refills: 2 | OUTPATIENT
Start: 2023-09-18

## 2023-09-29 DIAGNOSIS — G43.909 MIGRAINE WITHOUT STATUS MIGRAINOSUS, NOT INTRACTABLE, UNSPECIFIED MIGRAINE TYPE: ICD-10-CM

## 2023-09-29 DIAGNOSIS — G47.00 INSOMNIA, UNSPECIFIED TYPE: ICD-10-CM

## 2023-09-29 NOTE — TELEPHONE ENCOUNTER
Caller: Purnima Altamirano LINH    Relationship: Self    Best call back number: [phone]    Requested Prescriptions:   Requested Prescriptions     Pending Prescriptions Disp Refills    amitriptyline (ELAVIL) 50 MG tablet 30 tablet 0     Sig: Take 1 tablet by mouth Every Evening. Schedule appointment.    SUMAtriptan (IMITREX) 100 MG tablet 30 tablet 11     Sig: Take 1 tablet by mouth Daily As Needed for Migraine.        Pharmacy where request should be sent: [facility] [place] - [address] [facility] [phone] [facility] [phone] FX     Last office visit with prescribing clinician: 10/13/2021   Last telemedicine visit with prescribing clinician: Visit date not found   Next office visit with prescribing clinician: 10/9/2023     Additional details provided by patient:     Does the patient have less than a 3 day supply:  [] Yes  [] No    Would you like a call back once the refill request has been completed: [x] Yes [] No    If the office needs to give you a call back, can they leave a voicemail: [x] Yes [] No    Lyssa Avlarado Rep   09/29/23 15:10 EDT

## 2023-10-09 ENCOUNTER — OFFICE VISIT (OUTPATIENT)
Dept: INTERNAL MEDICINE | Facility: CLINIC | Age: 53
End: 2023-10-09
Payer: COMMERCIAL

## 2023-10-09 VITALS
HEART RATE: 88 BPM | DIASTOLIC BLOOD PRESSURE: 58 MMHG | WEIGHT: 102.4 LBS | SYSTOLIC BLOOD PRESSURE: 108 MMHG | TEMPERATURE: 98.3 F | BODY MASS INDEX: 19.33 KG/M2 | HEIGHT: 61 IN | OXYGEN SATURATION: 100 %

## 2023-10-09 DIAGNOSIS — J45.20 MILD INTERMITTENT ASTHMA WITHOUT COMPLICATION: ICD-10-CM

## 2023-10-09 DIAGNOSIS — G43.909 MIGRAINE WITHOUT STATUS MIGRAINOSUS, NOT INTRACTABLE, UNSPECIFIED MIGRAINE TYPE: Primary | ICD-10-CM

## 2023-10-09 DIAGNOSIS — N92.0 MENORRHAGIA WITH REGULAR CYCLE: ICD-10-CM

## 2023-10-09 DIAGNOSIS — Z85.828 HISTORY OF BASAL CELL CARCINOMA OF SKIN: ICD-10-CM

## 2023-10-09 DIAGNOSIS — K58.9 IRRITABLE BOWEL SYNDROME, UNSPECIFIED TYPE: ICD-10-CM

## 2023-10-09 DIAGNOSIS — Z00.00 ROUTINE HEALTH MAINTENANCE: ICD-10-CM

## 2023-10-09 DIAGNOSIS — R19.5 POSITIVE COLORECTAL CANCER SCREENING USING COLOGUARD TEST: ICD-10-CM

## 2023-10-09 DIAGNOSIS — M85.89 OSTEOPENIA OF MULTIPLE SITES: ICD-10-CM

## 2023-10-09 DIAGNOSIS — G47.00 INSOMNIA, UNSPECIFIED TYPE: ICD-10-CM

## 2023-10-09 RX ORDER — GABAPENTIN 100 MG/1
CAPSULE ORAL
COMMUNITY
Start: 2023-08-30 | End: 2024-08-29

## 2023-10-09 RX ORDER — AMITRIPTYLINE HYDROCHLORIDE 10 MG/1
50 TABLET, FILM COATED ORAL EVERY EVENING
Qty: 150 TABLET | Refills: 11 | Status: SHIPPED | OUTPATIENT
Start: 2023-10-09

## 2023-10-09 RX ORDER — ESTRADIOL 0.04 MG/D
FILM, EXTENDED RELEASE TRANSDERMAL
COMMUNITY
Start: 2023-06-26

## 2023-10-09 RX ORDER — SUMATRIPTAN 100 MG/1
100 TABLET, FILM COATED ORAL DAILY PRN
Qty: 9 TABLET | Refills: 11 | Status: SHIPPED | OUTPATIENT
Start: 2023-10-09

## 2023-10-09 RX ORDER — NAPROXEN 375 MG/1
375 TABLET ORAL
COMMUNITY
Start: 2023-08-30 | End: 2024-08-29

## 2023-10-09 NOTE — PROGRESS NOTES
Subjective     Purnima Altamirano is a 53 y.o. female, who presents with a chief complaint of   Chief Complaint   Patient presents with    Migraine     F/u and med check  Would like to discuss dose change for amitriptyline. Needing refills.        Fatigue  Associated symptoms include fatigue and headaches.   Insomnia  Associated symptoms include fatigue and headaches.   Headache   Associated symptoms include insomnia.   Migraine   Associated symptoms include insomnia.      1. Migraines.  She takes amitriptyline for preventive treatment and sumatriptan for abortive treatment.  She believes these are hormonal.    2. Heavy menses.  She sees gyn and is doing well with estrogen patch and progesterone.      3. Insomnia.  Amitriptyline helps.  She wants to try weaning down on the dose.  She takes 50 mg and would like the 10 mg tab so she can slowly decrease the dose.    4. Chronic knee pain, right.  She is on gabapentin per Dr. Ochoa.  She had fat necrosis from a steroid injection and is doing well s/p PRP.    The following portions of the patient's history were reviewed and updated as appropriate: allergies, current medications, past family history, past medical history, past social history, past surgical history and problem list.    Allergies: Cefdinir    Review of Systems   Constitutional:  Positive for fatigue.   Eyes: Negative.    Respiratory: Negative.     Cardiovascular: Negative.    Gastrointestinal: Negative.    Endocrine: Negative.    Genitourinary:  Positive for menstrual problem (heavy menses).   Musculoskeletal: Negative.    Skin: Negative.    Allergic/Immunologic: Negative.    Neurological:  Positive for headaches.   Hematological: Negative.    Psychiatric/Behavioral:  Positive for sleep disturbance. The patient has insomnia.        Objective     Wt Readings from Last 3 Encounters:   10/09/23 46.4 kg (102 lb 6.4 oz)   04/15/22 45.6 kg (100 lb 9.6 oz)   04/06/22 46.3 kg (102 lb)     Temp Readings from Last 3  Encounters:   10/09/23 98.3 øF (36.8 øC) (Infrared)   04/15/22 98.6 øF (37 øC) (Infrared)   10/13/21 98.7 øF (37.1 øC) (Temporal)     BP Readings from Last 3 Encounters:   10/09/23 108/58   04/15/22 102/68   04/06/22 102/62     Pulse Readings from Last 3 Encounters:   10/09/23 88   04/15/22 73   10/13/21 75     Body mass index is 19.35 kg/mý.    Physical Exam   Constitutional: She is oriented to person, place, and time. She appears well-developed.   HENT:   Head: Normocephalic and atraumatic.   Eyes: Conjunctivae are normal.   Neck: No thyromegaly present.   Cardiovascular: Normal rate, regular rhythm and normal heart sounds.   Pulmonary/Chest: Effort normal and breath sounds normal.   Abdominal: Soft. Bowel sounds are normal.   Musculoskeletal: Normal range of motion.   Lymphadenopathy:     She has no cervical adenopathy.   Neurological: She is alert and oriented to person, place, and time.   Skin: Skin is warm and dry. No rash noted.   Psychiatric: Her behavior is normal.   Nursing note and vitals reviewed.      Assessment/Plan   Diagnoses and all orders for this visit:    1. Migraine without status migrainosus, not intractable, unspecified migraine type (Primary)    2. History of basal cell carcinoma of skin    3. Insomnia, unspecified type    4. Mild intermittent asthma without complication    5. Menorrhagia with regular cycle    6. Irritable bowel syndrome, unspecified type    7. Osteopenia of multiple sites  -     DEXA Bone Density Axial; Future    8. Routine health maintenance  -     Mammo screening digital tomosynthesis bilateral w CAD; Future  -     CBC & Differential  -     Comprehensive Metabolic Panel  -     Hemoglobin A1c  -     Lipid Panel With / Chol / HDL Ratio  -     Vitamin B12  -     Vitamin D,25-Hydroxy  -     TSH    9. Positive colorectal cancer screening using Cologuard test      1. Migraines. Continue prn sumatriptan.    2. History of basal cell carcinoma.  Followed by Dr. Green.    3.  Insomnia.  Controlled with amitriptyline 50 mg nightly.  Will prescribe the 10 mg tablets so she can titrate down the dose.  Lifestyle measures.    4. Asthma.  No flare.     5. Menorrhagia.  Labs today.  She is followed by gyn and is on estrogen and progesterone.    6. IBS with constipation.  Pt adopted a gluten free diet several months ago and reports she is feeling much better.    7. Osteopenia.  On calcium and vitamin D.  Weight bearing exercise.  Per Dr. Benito.  Dexa scan 4/2021.  Dexa scan ordered.    8. Routine health maint.  She is considering flu vaccine.  Covid-19 vaccines done.  Mammogram due in December and is ordered.    9. History of positive Cologuard.  She had subsequent normal colonoscopy 4/2022 by Dr. Ulloa.      Current Outpatient Medications:     albuterol sulfate  (90 Base) MCG/ACT inhaler, INHALE 2 PUFFS EVERY 6 HOURS IF NEEDED FOR WHEEZING., Disp: , Rfl:     Calcium-Magnesium 500-250 MG tablet, Take  by mouth., Disp: , Rfl:     estradiol (VIVELLE-DOT) 0.0375 MG/24HR patch, , Disp: , Rfl:     gabapentin (NEURONTIN) 100 MG capsule, Take 1-3 tablets by mouth daily at night, Disp: , Rfl:     ibuprofen (ADVIL,MOTRIN) 600 MG tablet, Take 1 tablet by mouth Every 8 (Eight) Hours As Needed for Mild Pain ., Disp: 30 tablet, Rfl: 0    naproxen (NAPROSYN) 375 MG tablet, Take 1 tablet by mouth., Disp: , Rfl:     Progesterone (PROMETRIUM) 200 MG capsule, Take 1 capsule by mouth Every Night., Disp: 30 capsule, Rfl: 4    amitriptyline (ELAVIL) 10 MG tablet, Take 5 tablets by mouth Every Evening., Disp: 150 tablet, Rfl: 11    SUMAtriptan (IMITREX) 100 MG tablet, Take 1 tablet by mouth Daily As Needed for Migraine., Disp: 9 tablet, Rfl: 11  There are no discontinued medications.      Return in about 1 year (around 10/9/2024) for Annual physical.

## 2023-10-10 LAB
25(OH)D3+25(OH)D2 SERPL-MCNC: 52.1 NG/ML (ref 30–100)
ALBUMIN SERPL-MCNC: 4.9 G/DL (ref 3.5–5.2)
ALBUMIN/GLOB SERPL: 4.5 G/DL
ALP SERPL-CCNC: 39 U/L (ref 39–117)
ALT SERPL-CCNC: 21 U/L (ref 1–33)
AST SERPL-CCNC: 24 U/L (ref 1–32)
BASOPHILS # BLD AUTO: 0.02 10*3/MM3 (ref 0–0.2)
BASOPHILS NFR BLD AUTO: 0.5 % (ref 0–1.5)
BILIRUB SERPL-MCNC: 0.3 MG/DL (ref 0–1.2)
BUN SERPL-MCNC: 13 MG/DL (ref 6–20)
BUN/CREAT SERPL: 19.7 (ref 7–25)
CALCIUM SERPL-MCNC: 9.2 MG/DL (ref 8.6–10.5)
CHLORIDE SERPL-SCNC: 104 MMOL/L (ref 98–107)
CHOLEST SERPL-MCNC: 198 MG/DL (ref 0–200)
CHOLEST/HDLC SERPL: 1.85 {RATIO}
CO2 SERPL-SCNC: 28.7 MMOL/L (ref 22–29)
CREAT SERPL-MCNC: 0.66 MG/DL (ref 0.57–1)
EGFRCR SERPLBLD CKD-EPI 2021: 104.4 ML/MIN/1.73
EOSINOPHIL # BLD AUTO: 0.02 10*3/MM3 (ref 0–0.4)
EOSINOPHIL NFR BLD AUTO: 0.5 % (ref 0.3–6.2)
ERYTHROCYTE [DISTWIDTH] IN BLOOD BY AUTOMATED COUNT: 11.4 % (ref 12.3–15.4)
GLOBULIN SER CALC-MCNC: 1.1 GM/DL
GLUCOSE SERPL-MCNC: 65 MG/DL (ref 65–99)
HBA1C MFR BLD: 5 % (ref 4.8–5.6)
HCT VFR BLD AUTO: 40.6 % (ref 34–46.6)
HDLC SERPL-MCNC: 107 MG/DL (ref 40–60)
HGB BLD-MCNC: 13.9 G/DL (ref 12–15.9)
IMM GRANULOCYTES # BLD AUTO: 0.01 10*3/MM3 (ref 0–0.05)
IMM GRANULOCYTES NFR BLD AUTO: 0.2 % (ref 0–0.5)
LDLC SERPL CALC-MCNC: 84 MG/DL (ref 0–100)
LYMPHOCYTES # BLD AUTO: 0.86 10*3/MM3 (ref 0.7–3.1)
LYMPHOCYTES NFR BLD AUTO: 19.9 % (ref 19.6–45.3)
MCH RBC QN AUTO: 33.6 PG (ref 26.6–33)
MCHC RBC AUTO-ENTMCNC: 34.2 G/DL (ref 31.5–35.7)
MCV RBC AUTO: 98.1 FL (ref 79–97)
MONOCYTES # BLD AUTO: 0.36 10*3/MM3 (ref 0.1–0.9)
MONOCYTES NFR BLD AUTO: 8.3 % (ref 5–12)
NEUTROPHILS # BLD AUTO: 3.05 10*3/MM3 (ref 1.7–7)
NEUTROPHILS NFR BLD AUTO: 70.6 % (ref 42.7–76)
NRBC BLD AUTO-RTO: 0 /100 WBC (ref 0–0.2)
PLATELET # BLD AUTO: 197 10*3/MM3 (ref 140–450)
POTASSIUM SERPL-SCNC: 3.8 MMOL/L (ref 3.5–5.2)
PROT SERPL-MCNC: 6 G/DL (ref 6–8.5)
RBC # BLD AUTO: 4.14 10*6/MM3 (ref 3.77–5.28)
SODIUM SERPL-SCNC: 140 MMOL/L (ref 136–145)
TRIGL SERPL-MCNC: 33 MG/DL (ref 0–150)
TSH SERPL DL<=0.005 MIU/L-ACNC: 1.15 UIU/ML (ref 0.27–4.2)
VIT B12 SERPL-MCNC: 1964 PG/ML (ref 211–946)
VLDLC SERPL CALC-MCNC: 7 MG/DL (ref 5–40)
WBC # BLD AUTO: 4.32 10*3/MM3 (ref 3.4–10.8)

## 2023-11-02 ENCOUNTER — APPOINTMENT (OUTPATIENT)
Dept: BONE DENSITY | Facility: HOSPITAL | Age: 53
End: 2023-11-02
Payer: COMMERCIAL

## 2023-11-02 DIAGNOSIS — M85.89 OSTEOPENIA OF MULTIPLE SITES: ICD-10-CM

## 2023-11-02 PROCEDURE — 77080 DXA BONE DENSITY AXIAL: CPT

## 2023-12-22 ENCOUNTER — HOSPITAL ENCOUNTER (OUTPATIENT)
Dept: MAMMOGRAPHY | Facility: HOSPITAL | Age: 53
Discharge: HOME | End: 2023-12-22
Admitting: FAMILY MEDICINE
Payer: COMMERCIAL

## 2023-12-22 DIAGNOSIS — Z00.00 ROUTINE HEALTH MAINTENANCE: ICD-10-CM

## 2023-12-22 PROCEDURE — 77067 SCR MAMMO BI INCL CAD: CPT

## 2023-12-22 PROCEDURE — 77063 BREAST TOMOSYNTHESIS BI: CPT

## 2023-12-26 ENCOUNTER — TELEPHONE (OUTPATIENT)
Dept: INTERNAL MEDICINE | Facility: CLINIC | Age: 53
End: 2023-12-26
Payer: COMMERCIAL

## 2023-12-26 DIAGNOSIS — R92.8 ABNORMALITY OF LEFT BREAST ON SCREENING MAMMOGRAM: Primary | ICD-10-CM

## 2023-12-26 NOTE — TELEPHONE ENCOUNTER
"LVM for pt to call back and discuss    Relay     \"Some asymmetries were seen on the left breast.  These are likely cysts but the radiologist needs some additional views to evaluate, which I have ordered. Scheduling will call the patient to schedule further imaging.\"                ----- Message from Trace Min MD sent at 12/26/2023  3:33 PM EST -----  Please call the patient regarding her result. Some asymmetries were seen on the left breast.  These are likely cysts but the radiologist needs some additional views to evaluate, which I have ordered.  "

## 2023-12-27 ENCOUNTER — TELEPHONE (OUTPATIENT)
Dept: INTERNAL MEDICINE | Facility: CLINIC | Age: 53
End: 2023-12-27

## 2023-12-27 NOTE — TELEPHONE ENCOUNTER
"Name: Purnima Altamirano      Relationship: Self      Best Callback Number: [phone]      HUB PROVIDED THE RELAY MESSAGE FROM THE OFFICERelay      \"Some asymmetries were seen on the left breast.  These are likely cysts but the radiologist needs some additional views to evaluate, which I have ordered. Scheduling will call the patient to schedule further imaging.\"        PATIENT: VOICED UNDERSTANDING AND HAS NO FURTHER QUESTIONS AT THIS TIME      "

## 2024-02-15 ENCOUNTER — HOSPITAL ENCOUNTER (OUTPATIENT)
Dept: MAMMOGRAPHY | Facility: HOSPITAL | Age: 54
Discharge: HOME | End: 2024-02-15
Payer: COMMERCIAL

## 2024-02-15 ENCOUNTER — HOSPITAL ENCOUNTER (OUTPATIENT)
Dept: ULTRASOUND IMAGING | Facility: HOSPITAL | Age: 54
Discharge: HOME | End: 2024-02-15
Payer: COMMERCIAL

## 2024-02-15 DIAGNOSIS — R92.8 ABNORMALITY OF LEFT BREAST ON SCREENING MAMMOGRAM: ICD-10-CM

## 2024-02-15 PROCEDURE — G0279 TOMOSYNTHESIS, MAMMO: HCPCS

## 2024-02-15 PROCEDURE — 77065 DX MAMMO INCL CAD UNI: CPT

## 2024-02-15 PROCEDURE — 76642 ULTRASOUND BREAST LIMITED: CPT

## 2024-02-26 DIAGNOSIS — R92.8 ABNORMALITY OF LEFT BREAST ON SCREENING MAMMOGRAM: Primary | ICD-10-CM

## 2024-05-21 ENCOUNTER — TELEPHONE (OUTPATIENT)
Dept: INTERNAL MEDICINE | Facility: CLINIC | Age: 54
End: 2024-05-21

## 2024-05-21 NOTE — TELEPHONE ENCOUNTER
Caller: Purnima Altamirano    Relationship: Self    Best call back number: [phone]     Who are you requesting to speak with (clinical staff, provider,  specific staff member): DR HUGHES OR MA    What was the call regarding: PATIENT STATES THAT [facility] SCHEDULING CALLED HER TO SCHEDULE AN ADDITIONAL MAMMOGRAM AND ULTRASOUND. SHE JUST HAD ONE IN DECEMBER AND ANOTHER IN FEBRUARY, BUT SHE DOES NOT WANT TO SCHEDULE JUST YET DUE TO COST. SHE WANTS TO DELAY THE TESTS. SHE ALSO REVIEWED THE RESULTS WITH HER OB/GYN, WHO SAID THAT SHE WAS NOT CURRENTLY CONCERNED ABOUT THE RESULTS. CALL IF ADDITIONAL FOLLOW UP NEEDED

## 2024-08-13 ENCOUNTER — TRANSCRIBE ORDERS (OUTPATIENT)
Dept: ADMINISTRATIVE | Facility: HOSPITAL | Age: 54
End: 2024-08-13
Payer: COMMERCIAL

## 2024-08-13 ENCOUNTER — HOSPITAL ENCOUNTER (OUTPATIENT)
Dept: GENERAL RADIOLOGY | Facility: HOSPITAL | Age: 54
Discharge: HOME | End: 2024-08-13
Admitting: CHIROPRACTOR
Payer: COMMERCIAL

## 2024-08-13 DIAGNOSIS — R07.89 STERNUM PAIN: Primary | ICD-10-CM

## 2024-08-13 PROCEDURE — 72070 X-RAY EXAM THORAC SPINE 2VWS: CPT

## 2024-11-01 DIAGNOSIS — G47.00 INSOMNIA, UNSPECIFIED TYPE: ICD-10-CM

## 2024-11-01 DIAGNOSIS — G43.909 MIGRAINE WITHOUT STATUS MIGRAINOSUS, NOT INTRACTABLE, UNSPECIFIED MIGRAINE TYPE: ICD-10-CM

## 2024-11-01 NOTE — TELEPHONE ENCOUNTER
Caller: Adarsh Purnima LINH    Relationship: Self    Best call back number: [phone]    Requested Prescriptions:   Requested Prescriptions     Pending Prescriptions Disp Refills    amitriptyline (ELAVIL) 10 MG tablet [Pharmacy Med Name: AMITRIPTYLIN 10MG] 150 tablet 10     Sig: TAKE 5 TABLETS BY MOUTH EVERY EVENING.        [place] where request should be sent[place] [facility] [place] [address] [facility] - [phone] [facility] [phone]      Last office visit with prescribing clinician: 10/9/2023   Last telemedicine visit with prescribing clinician: Visit date not found   Next office visit with prescribing clinician: 11/25/2024     Additional details provided by patient: PATIENT HAS A 1 DAY SUPPLY LEFT OF THIS MEDICATION.     Does the patient have less than a 3 day supply:  [x] Yes  [] No    Would you like a call back once the refill request has been completed: [] Yes [x] No    If the office needs to give you a call back, can they leave a voicemail: [] Yes [x] No    Lyssa Lyle Rep   11/01/24 14:13 EDT

## 2024-11-04 RX ORDER — AMITRIPTYLINE HYDROCHLORIDE 10 MG/1
50 TABLET ORAL EVERY EVENING
Qty: 150 TABLET | Refills: 10 | Status: SHIPPED | OUTPATIENT
Start: 2024-11-04

## 2024-11-09 DIAGNOSIS — G43.909 MIGRAINE WITHOUT STATUS MIGRAINOSUS, NOT INTRACTABLE, UNSPECIFIED MIGRAINE TYPE: ICD-10-CM

## 2024-11-11 RX ORDER — SUMATRIPTAN 100 MG/1
100 TABLET, FILM COATED ORAL DAILY PRN
Qty: 9 TABLET | Refills: 10 | Status: SHIPPED | OUTPATIENT
Start: 2024-11-11

## 2024-11-11 NOTE — TELEPHONE ENCOUNTER
Rx Refill Note  Requested Prescriptions     Pending Prescriptions Disp Refills    SUMAtriptan (IMITREX) 100 MG tablet [Pharmacy Med Name: SUMATRIPTAN 100MG] 9 tablet 10     Sig: Take 1 tablet by mouth Daily As Needed for Migraine.      Last office visit with prescribing clinician: 10/9/2023   Last telemedicine visit with prescribing clinician: Visit date not found   Next office visit with prescribing clinician: 11/25/2024                         Would you like a call back once the refill request has been completed: [] Yes [] No    If the office needs to give you a call back, can they leave a voicemail: [] Yes [] No    Vivienne Willis MA  11/11/24, 10:53 EST

## 2024-11-25 ENCOUNTER — OFFICE VISIT (OUTPATIENT)
Dept: INTERNAL MEDICINE | Facility: CLINIC | Age: 54
End: 2024-11-25
Payer: COMMERCIAL

## 2024-11-25 VITALS
HEIGHT: 61 IN | BODY MASS INDEX: 18.54 KG/M2 | OXYGEN SATURATION: 95 % | WEIGHT: 98.2 LBS | DIASTOLIC BLOOD PRESSURE: 62 MMHG | TEMPERATURE: 98.4 F | HEART RATE: 87 BPM | SYSTOLIC BLOOD PRESSURE: 96 MMHG

## 2024-11-25 DIAGNOSIS — G47.00 INSOMNIA, UNSPECIFIED TYPE: ICD-10-CM

## 2024-11-25 DIAGNOSIS — R19.5 POSITIVE COLORECTAL CANCER SCREENING USING COLOGUARD TEST: ICD-10-CM

## 2024-11-25 DIAGNOSIS — M85.89 OSTEOPENIA OF MULTIPLE SITES: ICD-10-CM

## 2024-11-25 DIAGNOSIS — J45.20 MILD INTERMITTENT ASTHMA WITHOUT COMPLICATION: ICD-10-CM

## 2024-11-25 DIAGNOSIS — N92.0 MENORRHAGIA WITH REGULAR CYCLE: ICD-10-CM

## 2024-11-25 DIAGNOSIS — Z85.828 HISTORY OF BASAL CELL CARCINOMA OF SKIN: ICD-10-CM

## 2024-11-25 DIAGNOSIS — K58.9 IRRITABLE BOWEL SYNDROME, UNSPECIFIED TYPE: ICD-10-CM

## 2024-11-25 DIAGNOSIS — G43.909 MIGRAINE WITHOUT STATUS MIGRAINOSUS, NOT INTRACTABLE, UNSPECIFIED MIGRAINE TYPE: Primary | ICD-10-CM

## 2024-11-25 DIAGNOSIS — Z00.00 ROUTINE HEALTH MAINTENANCE: ICD-10-CM

## 2024-11-25 PROCEDURE — 99214 OFFICE O/P EST MOD 30 MIN: CPT | Performed by: FAMILY MEDICINE

## 2024-11-25 RX ORDER — SUMATRIPTAN SUCCINATE 100 MG/1
100 TABLET ORAL DAILY PRN
Qty: 9 TABLET | Refills: 12 | Status: SHIPPED | OUTPATIENT
Start: 2024-11-25

## 2024-11-25 RX ORDER — AMITRIPTYLINE HYDROCHLORIDE 50 MG/1
50 TABLET ORAL EVERY EVENING
Qty: 90 TABLET | Refills: 3 | Status: SHIPPED | OUTPATIENT
Start: 2024-11-25

## 2024-11-25 RX ORDER — GABAPENTIN 100 MG/1
CAPSULE ORAL
COMMUNITY
Start: 2024-10-28 | End: 2025-10-28

## 2024-11-25 RX ORDER — ALBUTEROL SULFATE 90 UG/1
2 INHALANT RESPIRATORY (INHALATION) EVERY 6 HOURS PRN
Qty: 18 G | Refills: 2 | Status: SHIPPED | OUTPATIENT
Start: 2024-11-25

## 2024-11-25 NOTE — PROGRESS NOTES
Subjective     Purnima Altamirano is a 54 y.o. female, who presents with a chief complaint of   Chief Complaint   Patient presents with    Migraine     Med check       Fatigue  Associated symptoms include headaches.   Insomnia  Associated symptoms include headaches.   Headache   Associated symptoms include insomnia.   Migraine   Associated symptoms include insomnia.      1. Migraines.  She takes amitriptyline for preventive treatment and sumatriptan for abortive treatment.  She believes these are hormonal.    2. Heavy menses.  She sees gyn and is doing well with estrogen patch and progesterone.   Last menses 3/2024.    3. Insomnia.  Amitriptyline helps.  She takes 50 mg.    4. Chronic knee pain, right.  She is on gabapentin per Dr. Ochoa.  She has stress fractures left knee and is 50% weight bearing at this point.    The following portions of the patient's history were reviewed and updated as appropriate: allergies, current medications, past family history, past medical history, past social history, past surgical history and problem list.    Allergies: Cefdinir    Review of Systems   Constitutional: Negative.    Eyes: Negative.    Respiratory: Negative.     Cardiovascular: Negative.    Gastrointestinal: Negative.    Endocrine: Negative.    Genitourinary:  Positive for menstrual problem (heavy menses).   Musculoskeletal: Negative.    Skin: Negative.    Allergic/Immunologic: Negative.    Neurological:  Positive for headaches.   Hematological: Negative.    Psychiatric/Behavioral:  Positive for sleep disturbance. The patient has insomnia.        Objective     Wt Readings from Last 3 Encounters:   11/25/24 44.5 kg (98 lb 3.2 oz)   10/09/23 46.4 kg (102 lb 6.4 oz)   04/15/22 45.6 kg (100 lb 9.6 oz)     Temp Readings from Last 3 Encounters:   11/25/24 98.4 °F (36.9 °C) (Infrared)   10/09/23 98.3 °F (36.8 °C) (Infrared)   04/15/22 98.6 °F (37 °C) (Infrared)     BP Readings from Last 3 Encounters:   11/25/24 96/62   10/09/23  108/58   04/15/22 102/68     Pulse Readings from Last 3 Encounters:   11/25/24 87   10/09/23 88   04/15/22 73     Body mass index is 18.55 kg/m².    Physical Exam   Constitutional: She is oriented to person, place, and time. She appears well-developed.   HENT:   Head: Normocephalic and atraumatic.   Eyes: Conjunctivae are normal.   Neck: No thyromegaly present.   Cardiovascular: Normal rate, regular rhythm and normal heart sounds.   Pulmonary/Chest: Effort normal and breath sounds normal.   Abdominal: Soft. Normal appearance and bowel sounds are normal.   Musculoskeletal: Normal range of motion.   Lymphadenopathy:     She has no cervical adenopathy.   Neurological: She is alert and oriented to person, place, and time.   Skin: Skin is warm and dry. No rash noted.   Psychiatric: Her behavior is normal.   Nursing note and vitals reviewed.      Assessment/Plan   Diagnoses and all orders for this visit:    1. Migraine without status migrainosus, not intractable, unspecified migraine type (Primary)  -     amitriptyline (ELAVIL) 50 MG tablet; Take 1 tablet by mouth Every Evening.  Dispense: 90 tablet; Refill: 3  -     SUMAtriptan (IMITREX) 100 MG tablet; Take 1 tablet by mouth Daily As Needed for Migraine.  Dispense: 9 tablet; Refill: 12    2. History of basal cell carcinoma of skin    3. Insomnia, unspecified type  -     amitriptyline (ELAVIL) 50 MG tablet; Take 1 tablet by mouth Every Evening.  Dispense: 90 tablet; Refill: 3    4. Mild intermittent asthma without complication  -     albuterol sulfate  (90 Base) MCG/ACT inhaler; Inhale 2 puffs Every 6 (Six) Hours As Needed for Wheezing.  Dispense: 18 g; Refill: 2    5. Menorrhagia with regular cycle    6. Irritable bowel syndrome, unspecified type    7. Osteopenia of multiple sites    8. Routine health maintenance  -     Comprehensive Metabolic Panel  -     Hemoglobin A1c  -     Lipid Panel With / Chol / HDL Ratio  -     Vitamin B12  -     Vitamin D,25-Hydroxy  -      TSH  -     CBC (No Diff)  -     Ferritin    9. Positive colorectal cancer screening using Cologuard test      1. Migraines. Continue prn sumatriptan.  Amitriptyline effective for abortive treatment.    2. History of basal cell carcinoma.  Followed by Dr. Green.    3. Insomnia.  Controlled with amitriptyline 50 mg nightly.  Lifestyle measures.    4. Asthma.  No flare.     5. Menorrhagia.  Labs today.  She is followed by gyn, Dr. Evie Sarmiento, and is on estrogen and progesterone.    6. IBS with constipation.  Pt adopted a gluten free diet several months ago and reports she is feeling much better.    7. Osteopenia.  On magnesium.  Weight bearing exercise.  Dexa scan due 11/2025.    8. Routine health maint.  She had flu vaccine at [facility].  Tdap at pharmacy.  Shingrix at pharmacy.   Mammogram UTD.    9. History of positive Cologuard.  She had subsequent normal colonoscopy 4/2022 by Dr. Ulloa.      Current Outpatient Medications:     albuterol sulfate  (90 Base) MCG/ACT inhaler, Inhale 2 puffs Every 6 (Six) Hours As Needed for Wheezing., Disp: 18 g, Rfl: 2    amitriptyline (ELAVIL) 50 MG tablet, Take 1 tablet by mouth Every Evening., Disp: 90 tablet, Rfl: 3    estradiol (VIVELLE-DOT) 0.0375 MG/24HR patch, , Disp: , Rfl:     gabapentin (NEURONTIN) 100 MG capsule, Take 1-3 tablets by mouth daily at night, Disp: , Rfl:     Progesterone (PROMETRIUM) 200 MG capsule, Take 1 capsule by mouth Every Night., Disp: 30 capsule, Rfl: 4    SUMAtriptan (IMITREX) 100 MG tablet, Take 1 tablet by mouth Daily As Needed for Migraine., Disp: 9 tablet, Rfl: 12  Medications Discontinued During This Encounter   Medication Reason    Calcium-Magnesium 500-250 MG tablet     ibuprofen (ADVIL,MOTRIN) 600 MG tablet     albuterol sulfate  (90 Base) MCG/ACT inhaler Reorder    amitriptyline (ELAVIL) 10 MG tablet Reorder    SUMAtriptan (IMITREX) 100 MG tablet Reorder         Return in about 1 year (around 11/25/2025) for Annual  physical.

## 2024-11-26 LAB
25(OH)D3+25(OH)D2 SERPL-MCNC: 97 NG/ML (ref 30–100)
ALBUMIN SERPL-MCNC: 4.3 G/DL (ref 3.5–5.2)
ALBUMIN/GLOB SERPL: 2.5 G/DL
ALP SERPL-CCNC: 53 U/L (ref 39–117)
ALT SERPL-CCNC: 17 U/L (ref 1–33)
AST SERPL-CCNC: 16 U/L (ref 1–32)
BILIRUB SERPL-MCNC: 0.3 MG/DL (ref 0–1.2)
BUN SERPL-MCNC: 14 MG/DL (ref 6–20)
BUN/CREAT SERPL: 18.9 (ref 7–25)
CALCIUM SERPL-MCNC: 8.9 MG/DL (ref 8.6–10.5)
CHLORIDE SERPL-SCNC: 104 MMOL/L (ref 98–107)
CHOLEST SERPL-MCNC: 166 MG/DL (ref 0–200)
CHOLEST/HDLC SERPL: 2.05 {RATIO}
CO2 SERPL-SCNC: 26.2 MMOL/L (ref 22–29)
CREAT SERPL-MCNC: 0.74 MG/DL (ref 0.57–1)
EGFRCR SERPLBLD CKD-EPI 2021: 95.7 ML/MIN/1.73
ERYTHROCYTE [DISTWIDTH] IN BLOOD BY AUTOMATED COUNT: 11.1 % (ref 12.3–15.4)
FERRITIN SERPL-MCNC: 48.5 NG/ML (ref 13–150)
GLOBULIN SER CALC-MCNC: 1.7 GM/DL
GLUCOSE SERPL-MCNC: 89 MG/DL (ref 65–99)
HBA1C MFR BLD: 5 % (ref 4.8–5.6)
HCT VFR BLD AUTO: 43.3 % (ref 34–46.6)
HDLC SERPL-MCNC: 81 MG/DL (ref 40–60)
HGB BLD-MCNC: 15 G/DL (ref 12–15.9)
LDLC SERPL CALC-MCNC: 76 MG/DL (ref 0–100)
MCH RBC QN AUTO: 34.6 PG (ref 26.6–33)
MCHC RBC AUTO-ENTMCNC: 34.6 G/DL (ref 31.5–35.7)
MCV RBC AUTO: 100 FL (ref 79–97)
PLATELET # BLD AUTO: 206 10*3/MM3 (ref 140–450)
POTASSIUM SERPL-SCNC: 4.8 MMOL/L (ref 3.5–5.2)
PROT SERPL-MCNC: 6 G/DL (ref 6–8.5)
RBC # BLD AUTO: 4.33 10*6/MM3 (ref 3.77–5.28)
SODIUM SERPL-SCNC: 140 MMOL/L (ref 136–145)
TRIGL SERPL-MCNC: 42 MG/DL (ref 0–150)
TSH SERPL DL<=0.005 MIU/L-ACNC: 0.96 UIU/ML (ref 0.27–4.2)
VIT B12 SERPL-MCNC: >2000 PG/ML (ref 211–946)
VLDLC SERPL CALC-MCNC: 9 MG/DL (ref 5–40)
WBC # BLD AUTO: 4.29 10*3/MM3 (ref 3.4–10.8)

## 2025-03-19 DIAGNOSIS — G43.909 MIGRAINE WITHOUT STATUS MIGRAINOSUS, NOT INTRACTABLE, UNSPECIFIED MIGRAINE TYPE: ICD-10-CM

## 2025-03-19 RX ORDER — SUMATRIPTAN SUCCINATE 100 MG/1
100 TABLET ORAL DAILY PRN
Qty: 9 TABLET | Refills: 11 | Status: SHIPPED | OUTPATIENT
Start: 2025-03-19

## 2025-03-19 NOTE — TELEPHONE ENCOUNTER
Rx Refill Note  Requested Prescriptions     Pending Prescriptions Disp Refills    SUMAtriptan (IMITREX) 100 MG tablet [Pharmacy Med Name: SUMATRIPTAN SUCCINATE 100 MG TABLET] 9 tablet 11     Sig: TAKE 1 TABLET BY MOUTH DAILY AS NEEDED FOR MIGRAINE.      Last office visit with prescribing clinician: 11/25/2024   Last telemedicine visit with prescribing clinician: Visit date not found   Next office visit with prescribing clinician: Visit date not found                         Would you like a call back once the refill request has been completed: [] Yes [] No    If the office needs to give you a call back, can they leave a voicemail: [] Yes [] No    Gibson Holguin MA  03/19/25, 12:33 EDT

## 2025-05-06 DIAGNOSIS — J45.20 MILD INTERMITTENT ASTHMA WITHOUT COMPLICATION: ICD-10-CM

## 2025-05-07 RX ORDER — ALBUTEROL SULFATE 90 UG/1
2 INHALANT RESPIRATORY (INHALATION) EVERY 6 HOURS PRN
Qty: 18 G | Refills: 1 | Status: SHIPPED | OUTPATIENT
Start: 2025-05-07

## 2025-05-07 NOTE — TELEPHONE ENCOUNTER
Rx Refill Note  Requested Prescriptions     Pending Prescriptions Disp Refills    albuterol sulfate  (90 Base) MCG/ACT inhaler [Pharmacy Med Name: ALBUTEROL SULFATE  MCG/ACT AEROSOL] 18 g 1     Sig: INHALE 2 PUFFS EVERY 6 (SIX) HOURS AS NEEDED FOR WHEEZING.      Last office visit with prescribing clinician: 11/25/2024   Last telemedicine visit with prescribing clinician: Visit date not found   Next office visit with prescribing clinician: Visit date not found                         Would you like a call back once the refill request has been completed: [] Yes [] No    If the office needs to give you a call back, can they leave a voicemail: [] Yes [] No    Gibson Holguin MA  05/07/25, 09:35 EDT

## 2025-05-16 DIAGNOSIS — G47.00 INSOMNIA, UNSPECIFIED TYPE: ICD-10-CM

## 2025-05-16 DIAGNOSIS — G43.909 MIGRAINE WITHOUT STATUS MIGRAINOSUS, NOT INTRACTABLE, UNSPECIFIED MIGRAINE TYPE: ICD-10-CM

## 2025-05-16 RX ORDER — AMITRIPTYLINE HYDROCHLORIDE 50 MG/1
50 TABLET ORAL EVERY EVENING
Qty: 90 TABLET | Refills: 2 | OUTPATIENT
Start: 2025-05-16

## 2025-06-26 DIAGNOSIS — J45.20 MILD INTERMITTENT ASTHMA WITHOUT COMPLICATION: ICD-10-CM

## 2025-06-26 RX ORDER — ALBUTEROL SULFATE 90 UG/1
2 INHALANT RESPIRATORY (INHALATION) EVERY 6 HOURS PRN
Qty: 18 G | Refills: 0 | Status: SHIPPED | OUTPATIENT
Start: 2025-06-26

## 2025-06-26 NOTE — TELEPHONE ENCOUNTER
Ok to fill?    Rx Refill Note  Requested Prescriptions     Pending Prescriptions Disp Refills    albuterol sulfate  (90 Base) MCG/ACT inhaler [Pharmacy Med Name: ALBUTEROL SULFATE  MCG/ACT AEROSOL] 18 g 0     Sig: INHALE 2 PUFFS EVERY 6 (SIX) HOURS AS NEEDED FOR WHEEZING.      Last office visit with prescribing clinician: 11/25/2024   Last telemedicine visit with prescribing clinician: Visit date not found   Next office visit with prescribing clinician: Visit date not found                         Would you like a call back once the refill request has been completed: [] Yes [] No    If the office needs to give you a call back, can they leave a voicemail: [] Yes [] No    Gibson Holguin MA  06/26/25, 10:50 EDT

## 2025-07-21 DIAGNOSIS — J45.20 MILD INTERMITTENT ASTHMA WITHOUT COMPLICATION: ICD-10-CM

## 2025-07-21 RX ORDER — ALBUTEROL SULFATE 90 UG/1
2 INHALANT RESPIRATORY (INHALATION) EVERY 6 HOURS PRN
Qty: 18 G | Refills: 0 | Status: SHIPPED | OUTPATIENT
Start: 2025-07-21

## 2025-07-21 NOTE — TELEPHONE ENCOUNTER
Rx Refill Note  Requested Prescriptions     Pending Prescriptions Disp Refills    albuterol sulfate  (90 Base) MCG/ACT inhaler [Pharmacy Med Name: ALBUTEROL SULFATE  MCG/ACT AEROSOL] 18 g 0     Sig: INHALE 2 PUFFS EVERY 6 (SIX) HOURS AS NEEDED FOR WHEEZING.      Last office visit with prescribing clinician: 11/25/2024   Last telemedicine visit with prescribing clinician: Visit date not found   Next office visit with prescribing clinician: Visit date not found                         Would you like a call back once the refill request has been completed: [] Yes [] No    If the office needs to give you a call back, can they leave a voicemail: [] Yes [] No    Gibson Holguin MA  07/21/25, 11:24 EDT

## (undated) DEVICE — CANN NASL CO2 TRULINK W/O2 A/

## (undated) DEVICE — KT ORCA ORCAPOD DISP STRL

## (undated) DEVICE — FLEX ADVANTAGE 1500CC: Brand: FLEX ADVANTAGE

## (undated) DEVICE — Device

## (undated) DEVICE — JACKT LAB F/R KNIT CUFF/COLR XLG BLU